# Patient Record
Sex: FEMALE | Race: WHITE | Employment: FULL TIME | URBAN - METROPOLITAN AREA
[De-identification: names, ages, dates, MRNs, and addresses within clinical notes are randomized per-mention and may not be internally consistent; named-entity substitution may affect disease eponyms.]

---

## 2017-03-24 ENCOUNTER — OFFICE VISIT (OUTPATIENT)
Dept: OBGYN CLINIC | Facility: CLINIC | Age: 27
End: 2017-03-24

## 2017-03-24 ENCOUNTER — APPOINTMENT (OUTPATIENT)
Dept: LAB | Facility: HOSPITAL | Age: 27
End: 2017-03-24
Attending: OBSTETRICS & GYNECOLOGY
Payer: COMMERCIAL

## 2017-03-24 VITALS — HEART RATE: 89 BPM | SYSTOLIC BLOOD PRESSURE: 115 MMHG | DIASTOLIC BLOOD PRESSURE: 74 MMHG

## 2017-03-24 DIAGNOSIS — Z11.3 SCREEN FOR STD (SEXUALLY TRANSMITTED DISEASE): ICD-10-CM

## 2017-03-24 DIAGNOSIS — Z01.419 ENCOUNTER FOR GYNECOLOGICAL EXAMINATION WITHOUT ABNORMAL FINDING: Primary | ICD-10-CM

## 2017-03-24 DIAGNOSIS — Z12.4 CERVICAL CANCER SCREENING: ICD-10-CM

## 2017-03-24 LAB
HBV SURFACE AG SERPL QL IA: NONREACTIVE
HCV AB SERPL QL IA: NONREACTIVE
HIV1+2 AB SERPL QL IA: NONREACTIVE
T PALLIDUM AB SER QL: NEGATIVE

## 2017-03-24 PROCEDURE — 36415 COLL VENOUS BLD VENIPUNCTURE: CPT

## 2017-03-24 PROCEDURE — 86803 HEPATITIS C AB TEST: CPT

## 2017-03-24 PROCEDURE — 86780 TREPONEMA PALLIDUM: CPT

## 2017-03-24 PROCEDURE — 87389 HIV-1 AG W/HIV-1&-2 AB AG IA: CPT

## 2017-03-24 PROCEDURE — 99395 PREV VISIT EST AGE 18-39: CPT | Performed by: OBSTETRICS & GYNECOLOGY

## 2017-03-24 PROCEDURE — 87340 HEPATITIS B SURFACE AG IA: CPT
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
## 2017-03-26 LAB
C TRACH DNA SPEC QL NAA+PROBE: NEGATIVE
N GONORRHOEA DNA SPEC QL NAA+PROBE: NEGATIVE

## 2017-03-27 ENCOUNTER — HOSPITAL ENCOUNTER (EMERGENCY)
Facility: HOSPITAL | Age: 27
Discharge: HOME | End: 2017-03-27
Attending: EMERGENCY MEDICINE
Payer: COMMERCIAL

## 2017-03-27 ENCOUNTER — HOSPITAL ENCOUNTER (OUTPATIENT)
Age: 27
Discharge: ANOTHER HOSPITAL | End: 2017-03-27
Attending: FAMILY MEDICINE
Payer: COMMERCIAL

## 2017-03-27 ENCOUNTER — APPOINTMENT (OUTPATIENT)
Dept: ULTRASOUND IMAGING | Facility: HOSPITAL | Age: 27
End: 2017-03-27
Attending: EMERGENCY MEDICINE
Payer: COMMERCIAL

## 2017-03-27 ENCOUNTER — APPOINTMENT (OUTPATIENT)
Dept: CT IMAGING | Facility: HOSPITAL | Age: 27
End: 2017-03-27
Attending: EMERGENCY MEDICINE
Payer: COMMERCIAL

## 2017-03-27 VITALS
HEART RATE: 86 BPM | SYSTOLIC BLOOD PRESSURE: 135 MMHG | RESPIRATION RATE: 16 BRPM | TEMPERATURE: 98 F | DIASTOLIC BLOOD PRESSURE: 83 MMHG | OXYGEN SATURATION: 99 %

## 2017-03-27 VITALS
WEIGHT: 150 LBS | HEART RATE: 76 BPM | RESPIRATION RATE: 16 BRPM | OXYGEN SATURATION: 100 % | TEMPERATURE: 98 F | BODY MASS INDEX: 23.54 KG/M2 | HEIGHT: 67 IN | SYSTOLIC BLOOD PRESSURE: 117 MMHG | DIASTOLIC BLOOD PRESSURE: 85 MMHG

## 2017-03-27 DIAGNOSIS — R11.0 NAUSEA: ICD-10-CM

## 2017-03-27 DIAGNOSIS — R10.11 ABDOMINAL PAIN, RIGHT UPPER QUADRANT: Primary | ICD-10-CM

## 2017-03-27 LAB
ALBUMIN SERPL-MCNC: 3.9 G/DL (ref 3.5–4.8)
ALP LIVER SERPL-CCNC: 67 U/L (ref 37–98)
ALT SERPL-CCNC: 18 U/L (ref 14–54)
AST SERPL-CCNC: 21 U/L (ref 15–41)
BASOPHILS # BLD AUTO: 0.05 X10(3) UL (ref 0–0.1)
BASOPHILS NFR BLD AUTO: 0.6 %
BILIRUB SERPL-MCNC: 0.2 MG/DL (ref 0.1–2)
BILIRUB UR QL STRIP.AUTO: NEGATIVE
BUN BLD-MCNC: 12 MG/DL (ref 8–20)
CALCIUM BLD-MCNC: 8.9 MG/DL (ref 8.3–10.3)
CHLORIDE: 104 MMOL/L (ref 101–111)
CO2: 28 MMOL/L (ref 22–32)
COLOR UR AUTO: YELLOW
CREAT BLD-MCNC: 0.71 MG/DL (ref 0.55–1.02)
EOSINOPHIL # BLD AUTO: 0.22 X10(3) UL (ref 0–0.3)
EOSINOPHIL NFR BLD AUTO: 2.6 %
ERYTHROCYTE [DISTWIDTH] IN BLOOD BY AUTOMATED COUNT: 14.9 % (ref 11.5–16)
GLUCOSE BLD-MCNC: 89 MG/DL (ref 70–99)
GLUCOSE UR STRIP.AUTO-MCNC: NEGATIVE MG/DL
HCT VFR BLD AUTO: 38.1 % (ref 34–50)
HGB BLD-MCNC: 12.6 G/DL (ref 12–16)
IMMATURE GRANULOCYTE COUNT: 0.02 X10(3) UL (ref 0–1)
IMMATURE GRANULOCYTE RATIO %: 0.2 %
KETONES UR STRIP.AUTO-MCNC: NEGATIVE MG/DL
LIPASE: 111 U/L (ref 73–393)
LYMPHOCYTES # BLD AUTO: 1.56 X10(3) UL (ref 0.9–4)
LYMPHOCYTES NFR BLD AUTO: 18.8 %
M PROTEIN MFR SERPL ELPH: 7.8 G/DL (ref 6.1–8.3)
MCH RBC QN AUTO: 27.1 PG (ref 27–33.2)
MCHC RBC AUTO-ENTMCNC: 33.1 G/DL (ref 31–37)
MCV RBC AUTO: 81.9 FL (ref 81–100)
MONOCYTES # BLD AUTO: 0.68 X10(3) UL (ref 0.1–0.6)
MONOCYTES NFR BLD AUTO: 8.2 %
NEUTROPHIL ABS PRELIM: 5.78 X10 (3) UL (ref 1.3–6.7)
NEUTROPHILS # BLD AUTO: 5.78 X10(3) UL (ref 1.3–6.7)
NEUTROPHILS NFR BLD AUTO: 69.6 %
NITRITE UR QL STRIP.AUTO: NEGATIVE
PH UR STRIP.AUTO: 5 [PH] (ref 4.5–8)
PLATELET # BLD AUTO: 293 10(3)UL (ref 150–450)
POCT LOT NUMBER: NORMAL
POCT URINE PREGNANCY: NEGATIVE
POTASSIUM SERPL-SCNC: 3.9 MMOL/L (ref 3.6–5.1)
PROT UR STRIP.AUTO-MCNC: NEGATIVE MG/DL
RBC # BLD AUTO: 4.65 X10(6)UL (ref 3.8–5.1)
RBC UR QL AUTO: NEGATIVE
RED CELL DISTRIBUTION WIDTH-SD: 44.8 FL (ref 35.1–46.3)
SODIUM SERPL-SCNC: 137 MMOL/L (ref 136–144)
SP GR UR STRIP.AUTO: 1.02 (ref 1–1.03)
UROBILINOGEN UR STRIP.AUTO-MCNC: <2 MG/DL
WBC # BLD AUTO: 8.3 X10(3) UL (ref 4–13)

## 2017-03-27 PROCEDURE — 87086 URINE CULTURE/COLONY COUNT: CPT | Performed by: EMERGENCY MEDICINE

## 2017-03-27 PROCEDURE — 76700 US EXAM ABDOM COMPLETE: CPT

## 2017-03-27 PROCEDURE — 96361 HYDRATE IV INFUSION ADD-ON: CPT

## 2017-03-27 PROCEDURE — 74177 CT ABD & PELVIS W/CONTRAST: CPT

## 2017-03-27 PROCEDURE — 99284 EMERGENCY DEPT VISIT MOD MDM: CPT

## 2017-03-27 PROCEDURE — 81001 URINALYSIS AUTO W/SCOPE: CPT | Performed by: EMERGENCY MEDICINE

## 2017-03-27 PROCEDURE — 80053 COMPREHEN METABOLIC PANEL: CPT | Performed by: EMERGENCY MEDICINE

## 2017-03-27 PROCEDURE — 85025 COMPLETE CBC W/AUTO DIFF WBC: CPT | Performed by: EMERGENCY MEDICINE

## 2017-03-27 PROCEDURE — 99215 OFFICE O/P EST HI 40 MIN: CPT

## 2017-03-27 PROCEDURE — 99285 EMERGENCY DEPT VISIT HI MDM: CPT

## 2017-03-27 PROCEDURE — 81025 URINE PREGNANCY TEST: CPT

## 2017-03-27 PROCEDURE — 83690 ASSAY OF LIPASE: CPT | Performed by: EMERGENCY MEDICINE

## 2017-03-27 PROCEDURE — 96374 THER/PROPH/DIAG INJ IV PUSH: CPT

## 2017-03-27 RX ORDER — ONDANSETRON 4 MG/1
8 TABLET, ORALLY DISINTEGRATING ORAL ONCE
Status: COMPLETED | OUTPATIENT
Start: 2017-03-27 | End: 2017-03-27

## 2017-03-27 RX ORDER — KETOROLAC TROMETHAMINE 30 MG/ML
30 INJECTION, SOLUTION INTRAMUSCULAR; INTRAVENOUS ONCE
Status: COMPLETED | OUTPATIENT
Start: 2017-03-27 | End: 2017-03-27

## 2017-03-27 NOTE — ED PROVIDER NOTES
Patient Seen in: [facility] Emergency Department    History   Patient presents with:  Abdomen/Flank Pain (GI/)    Stated Complaint: abd pain r/o arnold    HPI    18-year-old female presents emergency room with chief complaint of intermittent right 0938 98.2 °F (36.8 °C)   Temp src 03/27/17 0938 Temporal   SpO2 03/27/17 0938 99 %   O2 Device 03/27/17 0938 None (Room air)       Current:/85 mmHg  Pulse 76  Temp(Src) 98.2 °F (36.8 °C) (Temporal)  Resp 16  Ht 170.2 cm (5' 7\")  Wt 68.04 kg  BMI 23. Abnormal            Final result                 Please view results for these tests on the individual orders.    POCT PREGNANCY, URINE   RAINBOW DRAW BLUE   RAINBOW DRAW GOLD   RAINBOW DRAW LAVENDER   RAINBOW DRAW LIGHT GREEN   UA HOLD   URINE CULTU

## 2017-03-27 NOTE — ED NOTES
Patient reports pain remains controlled, declines offer of pain medication. No distress observed. Remains updated with plan of care. Taken to CT at this time by cart by PCT.

## 2017-03-27 NOTE — ED INITIAL ASSESSMENT (HPI)
For the past 2 days, patient c/o RUQ abdominal pain, worse after she eats. Pain radiates down abdomen and towards the right back. C/o nausea, no vomiting or fevers. Last BM yesterday and patient feels gassy. Patient not eating meat during this time.   Sonido Prophet

## 2017-03-27 NOTE — ED PROVIDER NOTES
Patient Seen in: [address]    History   Patient presents with:  Abdominal Pain    Stated Complaint: pain in upper right abdomin x1 week    HPI    16-year-old female presents to the clinic today with chief complaints of rig equivalent per week       Comment: occasionally      Review of Systems    Positive for stated complaint: pain in upper right abdomin x1 week  Other systems are as noted in HPI. Constitutional and vital signs reviewed.       All other systems reviewed and n appear to be consistent with cholecystitis/cholelithiasis. Patient likely needs a CT and/or ultrasound. Due to lack of these imaging here, patient was instructed to proceed to the emergency room for further treatment. She was given Zofran 8 mg p.o.  ×1.

## 2017-03-27 NOTE — ED INITIAL ASSESSMENT (HPI)
Patient reports RUQ pain that sometimes goes around into back, reports intermittent since Monday and now becoming more frequent. States most severe and burning after eating, sometimes sharp/throbbing. Denies urinary complaints. Denies fever/chills.  Admits

## 2017-03-28 ENCOUNTER — OFFICE VISIT (OUTPATIENT)
Dept: GASTROENTEROLOGY | Facility: CLINIC | Age: 27
End: 2017-03-28

## 2017-03-28 VITALS
WEIGHT: 153.19 LBS | SYSTOLIC BLOOD PRESSURE: 110 MMHG | BODY MASS INDEX: 24.04 KG/M2 | HEIGHT: 67 IN | DIASTOLIC BLOOD PRESSURE: 74 MMHG | HEART RATE: 80 BPM

## 2017-03-28 DIAGNOSIS — K82.4 GALLBLADDER POLYP: ICD-10-CM

## 2017-03-28 DIAGNOSIS — R10.11 RUQ PAIN: Primary | ICD-10-CM

## 2017-03-28 PROCEDURE — 99212 OFFICE O/P EST SF 10 MIN: CPT | Performed by: PHYSICIAN ASSISTANT

## 2017-03-28 PROCEDURE — 99244 OFF/OP CNSLTJ NEW/EST MOD 40: CPT | Performed by: PHYSICIAN ASSISTANT

## 2017-03-28 NOTE — PATIENT INSTRUCTIONS
1. BLAND BRAT diet - bananas, rice, applesauce toast and soup - until Friday morning (then trial oatmeal/normal diet)    2. Avoid fatty/greasy foods. 3. Keep a food and symptom diary. 4. Call me and let me know how you are doing.     5. Follow up ultr

## 2017-03-28 NOTE — H&P
[address] [facility] [address] Gastroenterology                                                                                                  Clinic History and Physical     Pa both parents with GB removal    * Mother with cholecystectomy at age 28  - Negative for colon, esophageal and gastric cancer    Prior endoscopies:  - None    Pertinent Surgical Hx:  - None  - No known problems with sedation    Social Hx:  - Non-smoking  - and heat intolerance  MUSCULOSKELETAL:  negative for joint effusion/severe erythema; no new joint complaints   BEHAVIOR/PSYCH:  negative for psychotic behavior      PHYSICAL EXAM:   Blood pressure 110/74, pulse 80, height 5' 7\" (1.702 m), weight 153 lb 3. otherwise healthy patient.      #RUQ pain - comes and goes; colicky   #Nausea/decreased appetite - improved with BRAT diet    Recommend:  - BRAT diet until Friday - then trial foods as tolerated   - Call with symptoms Friday  - Keep food and symptom diary

## 2017-04-09 ENCOUNTER — HOSPITAL ENCOUNTER (OUTPATIENT)
Age: 27
Discharge: HOME | End: 2017-04-09
Attending: FAMILY MEDICINE
Payer: COMMERCIAL

## 2017-04-09 ENCOUNTER — APPOINTMENT (OUTPATIENT)
Dept: GENERAL RADIOLOGY | Age: 27
End: 2017-04-09
Attending: FAMILY MEDICINE
Payer: COMMERCIAL

## 2017-04-09 VITALS
SYSTOLIC BLOOD PRESSURE: 121 MMHG | OXYGEN SATURATION: 98 % | HEART RATE: 112 BPM | TEMPERATURE: 98 F | WEIGHT: 155 LBS | HEIGHT: 67 IN | RESPIRATION RATE: 16 BRPM | BODY MASS INDEX: 24.33 KG/M2 | DIASTOLIC BLOOD PRESSURE: 83 MMHG

## 2017-04-09 DIAGNOSIS — J40 BRONCHITIS: Primary | ICD-10-CM

## 2017-04-09 PROCEDURE — 99214 OFFICE O/P EST MOD 30 MIN: CPT

## 2017-04-09 PROCEDURE — 99213 OFFICE O/P EST LOW 20 MIN: CPT

## 2017-04-09 PROCEDURE — 71020 XR CHEST PA + LAT CHEST (CPT=71020): CPT

## 2017-04-09 RX ORDER — AZITHROMYCIN 250 MG/1
TABLET, FILM COATED ORAL
Qty: 1 PACKAGE | Refills: 0 | Status: SHIPPED | OUTPATIENT
Start: 2017-04-09 | End: 2017-04-14

## 2017-04-09 NOTE — ED PROVIDER NOTES
Patient Seen in: [address]    History   Patient presents with:  Cough/URI    Stated Complaint: cough    Patient is a 23year old female presenting with cough. The history is provided by the patient.    Cough  This is a n signs reviewed. All other systems reviewed and negative except as noted above. PSFH elements reviewed from today and agreed except as otherwise stated in HPI.     Physical Exam     ED Triage Vitals   BP 04/09/17 1402 121/83 mmHg   Pulse 04/09/17 140 Normal, Disp-1 Package, Digerati0

## 2017-04-09 NOTE — ED INITIAL ASSESSMENT (HPI)
Patient states having productive cough x 5 days ago, patient now c/o cough with chest congestion, loss of appetite, SOB noted with cough and ear pressure. Patient denies fever, states having chills yesterday.   Patient denies sore throat, abdominal, n/v or

## 2017-04-14 ENCOUNTER — OFFICE VISIT (OUTPATIENT)
Dept: INTERNAL MEDICINE CLINIC | Facility: CLINIC | Age: 27
End: 2017-04-14

## 2017-04-14 VITALS
OXYGEN SATURATION: 98 % | SYSTOLIC BLOOD PRESSURE: 107 MMHG | HEIGHT: 67 IN | BODY MASS INDEX: 23.86 KG/M2 | HEART RATE: 75 BPM | DIASTOLIC BLOOD PRESSURE: 72 MMHG | WEIGHT: 152 LBS

## 2017-04-14 DIAGNOSIS — K80.50 BILIARY COLIC: ICD-10-CM

## 2017-04-14 DIAGNOSIS — J18.9 CAP (COMMUNITY ACQUIRED PNEUMONIA): Primary | ICD-10-CM

## 2017-04-14 PROCEDURE — 99212 OFFICE O/P EST SF 10 MIN: CPT | Performed by: INTERNAL MEDICINE

## 2017-04-14 PROCEDURE — 99213 OFFICE O/P EST LOW 20 MIN: CPT | Performed by: INTERNAL MEDICINE

## 2017-04-14 NOTE — PROGRESS NOTES
Had 2 er visits for biliary colic with negative gi eval and then for r ll pneomonits  On a z pack feels improved has gi usept and diarrhea  Blood pressure 107/72, pulse 75, height 5' 7\" (1.702 m), weight 152 lb (68.947 kg), last menstrual period 04/11/201

## 2017-05-03 ENCOUNTER — OFFICE VISIT (OUTPATIENT)
Dept: OBGYN CLINIC | Facility: CLINIC | Age: 27
End: 2017-05-03

## 2017-05-03 VITALS
HEART RATE: 80 BPM | WEIGHT: 150.81 LBS | SYSTOLIC BLOOD PRESSURE: 121 MMHG | DIASTOLIC BLOOD PRESSURE: 76 MMHG | BODY MASS INDEX: 24 KG/M2

## 2017-05-03 DIAGNOSIS — N93.9 ABNORMAL UTERINE BLEEDING (AUB): Primary | ICD-10-CM

## 2017-05-03 PROCEDURE — 99213 OFFICE O/P EST LOW 20 MIN: CPT | Performed by: OBSTETRICS & GYNECOLOGY

## 2017-05-03 NOTE — PROGRESS NOTES
Misael Oconnor is a 23year old female [facility] Patient's last menstrual period was 04/26/2017 (exact date). Patient presents with:  Gyn Problem: irregular menses  Patient presents today for irregular bleeding.  She forgot to tell me during her annual Narrative       MEDICATIONS:  No current outpatient prescriptions on file.     ALLERGIES:    Nitrofurantoin              Comment:Other reaction(s): GI irritation, dizziness  Phenylephrine               Comment:Other reaction(s): PHENYLEPHRINE HCL      Revie

## 2017-05-06 ENCOUNTER — HOSPITAL ENCOUNTER (OUTPATIENT)
Age: 27
Discharge: HOME | End: 2017-05-06
Attending: FAMILY MEDICINE
Payer: COMMERCIAL

## 2017-05-06 VITALS
HEART RATE: 80 BPM | OXYGEN SATURATION: 99 % | DIASTOLIC BLOOD PRESSURE: 78 MMHG | HEIGHT: 67 IN | TEMPERATURE: 98 F | BODY MASS INDEX: 23.54 KG/M2 | WEIGHT: 150 LBS | SYSTOLIC BLOOD PRESSURE: 123 MMHG | RESPIRATION RATE: 16 BRPM

## 2017-05-06 DIAGNOSIS — J02.0 STREPTOCOCCAL SORE THROAT: Primary | ICD-10-CM

## 2017-05-06 PROCEDURE — 87430 STREP A AG IA: CPT

## 2017-05-06 PROCEDURE — 99213 OFFICE O/P EST LOW 20 MIN: CPT

## 2017-05-06 PROCEDURE — 99214 OFFICE O/P EST MOD 30 MIN: CPT

## 2017-05-06 RX ORDER — AMOXICILLIN 875 MG/1
875 TABLET, COATED ORAL 2 TIMES DAILY
Qty: 20 TABLET | Refills: 0 | Status: SHIPPED | OUTPATIENT
Start: 2017-05-06 | End: 2017-05-16

## 2017-05-06 NOTE — ED INITIAL ASSESSMENT (HPI)
Patient states having loss of appetite, sore throat, headaches, bilateral ear pain, fever x 3 days. Patient denies n/v or abdominal pain.

## 2017-05-06 NOTE — ED PROVIDER NOTES
Patient Seen in: [address]    History   Patient presents with:  Sore Throat  Fever (infectious)  Ear Problem Pain (neurosensory)    Stated Complaint: sore throat felt warm ear pain    HPI    Patient here with sore throa Temp([facility]) 98.4 °F (36.9 °C) (Oral)  Resp 16  Ht 170.2 cm (5' 7\")  Wt 68.04 kg  BMI 23.49 kg/m2  SpO2 99%  LMP 04/11/2017 (Exact Date)    GENERAL: well developed, well nourished, well hydrated, no distress  HEAD: normocephalic, atraumatic  EYES: sclera no

## 2017-08-15 ENCOUNTER — TELEPHONE (OUTPATIENT)
Dept: PEDIATRICS CLINIC | Facility: CLINIC | Age: 27
End: 2017-08-15

## 2017-08-15 NOTE — TELEPHONE ENCOUNTER
At last visit patient declined hormonal contraception. That will be the only way to try and control her bleeding. If she would like hormonal contraception then will need to make an apt to discuss further.

## 2017-08-15 NOTE — TELEPHONE ENCOUNTER
Pt is still having irregular spotting between periods. States it is lighter than a normal period but heavier than spotting.

## 2017-08-15 NOTE — TELEPHONE ENCOUNTER
Pt calling to report she continues having irregular bleeding. Pt states she saw [facility] a few months ago for the same issue. Pt reports she is getting monthly periods with break through bleeding that last 3-5 days.  Pt reports the break through bleeding is more

## 2017-08-15 NOTE — TELEPHONE ENCOUNTER
Lmtcb. (see 5/3/17 [facility] notes: POC for Provera if irregular bleeding continues.  Does pt want to proceed with POC?)

## 2017-08-18 ENCOUNTER — APPOINTMENT (OUTPATIENT)
Dept: LAB | Facility: HOSPITAL | Age: 27
End: 2017-08-18
Attending: OBSTETRICS & GYNECOLOGY
Payer: COMMERCIAL

## 2017-08-18 ENCOUNTER — OFFICE VISIT (OUTPATIENT)
Dept: OBGYN CLINIC | Facility: CLINIC | Age: 27
End: 2017-08-18

## 2017-08-18 VITALS
SYSTOLIC BLOOD PRESSURE: 112 MMHG | BODY MASS INDEX: 23 KG/M2 | DIASTOLIC BLOOD PRESSURE: 77 MMHG | WEIGHT: 148.63 LBS | HEART RATE: 88 BPM

## 2017-08-18 DIAGNOSIS — N92.6 IRREGULAR MENSES: Primary | ICD-10-CM

## 2017-08-18 DIAGNOSIS — N92.6 IRREGULAR MENSES: ICD-10-CM

## 2017-08-18 LAB
ERYTHROCYTE [DISTWIDTH] IN BLOOD BY AUTOMATED COUNT: 14.7 % (ref 11–15)
HCT VFR BLD AUTO: 39.9 % (ref 35–48)
HGB BLD-MCNC: 13 G/DL (ref 12–16)
MCH RBC QN AUTO: 28.2 PG (ref 27–32)
MCHC RBC AUTO-ENTMCNC: 32.7 G/DL (ref 32–37)
MCV RBC AUTO: 86.3 FL (ref 80–100)
PLATELET # BLD AUTO: 284 K/UL (ref 140–400)
PMV BLD AUTO: 10.1 FL (ref 7.4–10.3)
RBC # BLD AUTO: 4.62 M/UL (ref 3.7–5.4)
TSH SERPL-ACNC: 1.58 UIU/ML (ref 0.45–5.33)
WBC # BLD AUTO: 6.1 K/UL (ref 4–11)

## 2017-08-18 PROCEDURE — 84443 ASSAY THYROID STIM HORMONE: CPT

## 2017-08-18 PROCEDURE — 99214 OFFICE O/P EST MOD 30 MIN: CPT | Performed by: OBSTETRICS & GYNECOLOGY

## 2017-08-18 PROCEDURE — 36415 COLL VENOUS BLD VENIPUNCTURE: CPT

## 2017-08-18 PROCEDURE — 85027 COMPLETE CBC AUTOMATED: CPT

## 2017-08-18 RX ORDER — DROSPIRENONE AND ETHINYL ESTRADIOL 0.02-3(28)
1 KIT ORAL DAILY
Qty: 1 PACKAGE | Refills: 11 | Status: SHIPPED | OUTPATIENT
Start: 2017-08-18 | End: 2018-08-23

## 2017-08-18 NOTE — PROGRESS NOTES
Francisco Stark is a 33year old female  Patient's last menstrual period was 2017. Patient presents with:  Consult: options for irregular bleeding  Patient continues to have abnormal uterine bleeding since last visit.  She states she has (GIANVI) 3-0.02 MG Oral Tab, Take 1 tablet by mouth daily. , Disp: 1 Package, Rfl: 11    ALLERGIES:    Nitrofurantoin              Comment:Other reaction(s): GI irritation, dizziness  Phenylephrine               Comment:Other reaction(s): PHENYLEPHRINE HCL Reviewed OCPs as well as NuvaRing. This would also help acne. Discussed no menses with Mirena IUD option. Reviewed risks and benefits with the patient. Patient is not interested. 3. eRx sent for OCPs to start with next menses   4.  Encourage continued cond

## 2017-09-05 ENCOUNTER — TELEPHONE (OUTPATIENT)
Dept: GASTROENTEROLOGY | Facility: CLINIC | Age: 27
End: 2017-09-05

## 2017-09-05 ENCOUNTER — APPOINTMENT (OUTPATIENT)
Dept: ULTRASOUND IMAGING | Facility: HOSPITAL | Age: 27
End: 2017-09-05
Attending: PHYSICIAN ASSISTANT
Payer: COMMERCIAL

## 2017-09-05 ENCOUNTER — APPOINTMENT (OUTPATIENT)
Dept: CT IMAGING | Facility: HOSPITAL | Age: 27
End: 2017-09-05
Attending: PHYSICIAN ASSISTANT
Payer: COMMERCIAL

## 2017-09-05 ENCOUNTER — HOSPITAL ENCOUNTER (EMERGENCY)
Facility: HOSPITAL | Age: 27
Discharge: HOME | End: 2017-09-05
Attending: PHYSICIAN ASSISTANT
Payer: COMMERCIAL

## 2017-09-05 VITALS
TEMPERATURE: 99 F | WEIGHT: 150 LBS | RESPIRATION RATE: 16 BRPM | BODY MASS INDEX: 23.54 KG/M2 | HEIGHT: 67 IN | DIASTOLIC BLOOD PRESSURE: 68 MMHG | SYSTOLIC BLOOD PRESSURE: 102 MMHG | HEART RATE: 75 BPM | OXYGEN SATURATION: 99 %

## 2017-09-05 DIAGNOSIS — R10.9 ABDOMINAL PAIN, ACUTE: Primary | ICD-10-CM

## 2017-09-05 DIAGNOSIS — K82.4 GALLBLADDER POLYP: ICD-10-CM

## 2017-09-05 DIAGNOSIS — R11.0 NAUSEA: ICD-10-CM

## 2017-09-05 LAB
ALBUMIN SERPL BCP-MCNC: 4.4 G/DL (ref 3.5–4.8)
ALP SERPL-CCNC: 48 U/L (ref 32–100)
ALT SERPL-CCNC: 13 U/L (ref 14–54)
ANION GAP SERPL CALC-SCNC: 10 MMOL/L (ref 0–18)
AST SERPL-CCNC: 23 U/L (ref 15–41)
B-HCG UR QL: NEGATIVE
BASOPHILS # BLD: 0 K/UL (ref 0–0.2)
BASOPHILS NFR BLD: 0 %
BILIRUB DIRECT SERPL-MCNC: 0.1 MG/DL (ref 0–0.2)
BILIRUB SERPL-MCNC: 0.9 MG/DL (ref 0.3–1.2)
BILIRUB UR QL: NEGATIVE
BUN SERPL-MCNC: 12 MG/DL (ref 8–20)
BUN/CREAT SERPL: 14.6 (ref 10–20)
CALCIUM SERPL-MCNC: 9.2 MG/DL (ref 8.5–10.5)
CHLORIDE SERPL-SCNC: 102 MMOL/L (ref 95–110)
CO2 SERPL-SCNC: 24 MMOL/L (ref 22–32)
COLOR UR: YELLOW
CREAT SERPL-MCNC: 0.82 MG/DL (ref 0.5–1.5)
EOSINOPHIL # BLD: 0.1 K/UL (ref 0–0.7)
EOSINOPHIL NFR BLD: 2 %
ERYTHROCYTE [DISTWIDTH] IN BLOOD BY AUTOMATED COUNT: 14.2 % (ref 11–15)
GLUCOSE SERPL-MCNC: 98 MG/DL (ref 70–99)
GLUCOSE UR-MCNC: NEGATIVE MG/DL
HCT VFR BLD AUTO: 37.9 % (ref 35–48)
HGB BLD-MCNC: 12.8 G/DL (ref 12–16)
KETONES UR-MCNC: NEGATIVE MG/DL
LIPASE SERPL-CCNC: 23 U/L (ref 22–51)
LYMPHOCYTES # BLD: 1.2 K/UL (ref 1–4)
LYMPHOCYTES NFR BLD: 13 %
MCH RBC QN AUTO: 28.9 PG (ref 27–32)
MCHC RBC AUTO-ENTMCNC: 33.8 G/DL (ref 32–37)
MCV RBC AUTO: 85.4 FL (ref 80–100)
MONOCYTES # BLD: 0.6 K/UL (ref 0–1)
MONOCYTES NFR BLD: 7 %
NEUTROPHILS # BLD AUTO: 7.5 K/UL (ref 1.8–7.7)
NEUTROPHILS NFR BLD: 79 %
NITRITE UR QL STRIP.AUTO: NEGATIVE
OSMOLALITY UR CALC.SUM OF ELEC: 282 MOSM/KG (ref 275–295)
PH UR: 5 [PH] (ref 5–8)
PLATELET # BLD AUTO: 272 K/UL (ref 140–400)
PMV BLD AUTO: 9.2 FL (ref 7.4–10.3)
POTASSIUM SERPL-SCNC: 3.7 MMOL/L (ref 3.3–5.1)
PROT SERPL-MCNC: 7.6 G/DL (ref 5.9–8.4)
PROT UR-MCNC: NEGATIVE MG/DL
RBC # BLD AUTO: 4.44 M/UL (ref 3.7–5.4)
RBC #/AREA URNS AUTO: 5 /HPF
SODIUM SERPL-SCNC: 136 MMOL/L (ref 136–144)
SP GR UR STRIP: 1.02 (ref 1–1.03)
UROBILINOGEN UR STRIP-ACNC: <2
VIT C UR-MCNC: NEGATIVE MG/DL
WBC # BLD AUTO: 9.5 K/UL (ref 4–11)
WBC #/AREA URNS AUTO: 2 /HPF

## 2017-09-05 PROCEDURE — 80076 HEPATIC FUNCTION PANEL: CPT | Performed by: PHYSICIAN ASSISTANT

## 2017-09-05 PROCEDURE — 83690 ASSAY OF LIPASE: CPT | Performed by: PHYSICIAN ASSISTANT

## 2017-09-05 PROCEDURE — 99284 EMERGENCY DEPT VISIT MOD MDM: CPT

## 2017-09-05 PROCEDURE — 80048 BASIC METABOLIC PNL TOTAL CA: CPT | Performed by: PHYSICIAN ASSISTANT

## 2017-09-05 PROCEDURE — 81025 URINE PREGNANCY TEST: CPT

## 2017-09-05 PROCEDURE — 74177 CT ABD & PELVIS W/CONTRAST: CPT | Performed by: PHYSICIAN ASSISTANT

## 2017-09-05 PROCEDURE — 85025 COMPLETE CBC W/AUTO DIFF WBC: CPT | Performed by: PHYSICIAN ASSISTANT

## 2017-09-05 PROCEDURE — 96374 THER/PROPH/DIAG INJ IV PUSH: CPT

## 2017-09-05 PROCEDURE — 76705 ECHO EXAM OF ABDOMEN: CPT | Performed by: PHYSICIAN ASSISTANT

## 2017-09-05 PROCEDURE — 81001 URINALYSIS AUTO W/SCOPE: CPT | Performed by: PHYSICIAN ASSISTANT

## 2017-09-05 RX ORDER — ONDANSETRON 4 MG/1
4 TABLET, ORALLY DISINTEGRATING ORAL EVERY 6 HOURS PRN
Qty: 10 TABLET | Refills: 0 | Status: SHIPPED | OUTPATIENT
Start: 2017-09-05 | End: 2017-09-08

## 2017-09-05 RX ORDER — TRAMADOL HYDROCHLORIDE 50 MG/1
50 TABLET ORAL EVERY 6 HOURS PRN
Qty: 12 TABLET | Refills: 0 | Status: SHIPPED | OUTPATIENT
Start: 2017-09-05 | End: 2018-06-07 | Stop reason: ALTCHOICE

## 2017-09-05 RX ORDER — ONDANSETRON 2 MG/ML
4 INJECTION INTRAMUSCULAR; INTRAVENOUS ONCE
Status: COMPLETED | OUTPATIENT
Start: 2017-09-05 | End: 2017-09-05

## 2017-09-05 NOTE — ED PROVIDER NOTES
Patient Seen in: [facility] Emergency Department    History   Patient presents with:  Abdomen/Flank Pain (GI/)    Stated Complaint:     HPI    64-year-old female presents with chief complaint of right upper quadrant abdominal pain.   Patient stat 0.0 oz/week     Comment: OCC      Review of Systems    Positive for stated complaint:   Other systems are as noted in HPI. Constitutional and vital signs reviewed. All other systems reviewed and negative except as noted above.     PSFH elements review speech. Skin: Skin is normal to inspection. Warm and dry. No obvious bruising. No obvious rash.     ED Course     Labs Reviewed   URINALYSIS WITH CULTURE REFLEX - Abnormal; Notable for the following:        Result Value    Clarity Urine Hazy (*)     Blo Small volume free fluid in the pelvis, likely physiologic. 6. Bilateral L5-S1 spondylolysis without significant spondylolisthesis. 7. Lesser incidental findings as above. Patient refused offered pain medication.   Physical exam remained stable over

## 2017-09-05 NOTE — TELEPHONE ENCOUNTER
Patient last seen in March 2017 - \"Patient describes symptoms suggestive of biliary colic. Negative CT A/P (and US - no dilatation nor stones observed - with normal labs. Reassuring improvement of symptoms with BRAT diet - less nausea today.  No fevers/chi

## 2017-09-05 NOTE — TELEPHONE ENCOUNTER
Pt contacted and accepted f/u appt with PB on 9/7/17 @ 9:30am, directions provided to the [facility].

## 2017-09-05 NOTE — TELEPHONE ENCOUNTER
CHASE to MAGNOLIA    Pt contacted and reviewed MAGNOLIA's message below, she verbalized understanding. She states she is on her way to the ED.

## 2017-09-05 NOTE — TELEPHONE ENCOUNTER
Pt was in the E.R  today and was told to be seen within the next 2 days. Pt had previous seen MAGNOLIA penat before. Pls call at:[phone],thanks.

## 2017-09-05 NOTE — ED NOTES
Pt c/o right sided abdominal pain that started yesterday with nausea. Pt states took advil yesterday without relief. Pt with hx of biliary colic, unsure if pain is similar.

## 2017-09-05 NOTE — TELEPHONE ENCOUNTER
Pt states that she has been having severe nausea and abdominal cramping underneath ribs on right side that radiates to back since yesterday afternoon. Call transferred to RN.

## 2017-09-05 NOTE — ED INITIAL ASSESSMENT (HPI)
Patient complains of abd pain which started yesterday, states she has hx of intermittent abd pain d/t biliary colic, states the pain only lasts a couple hours but this time the pain is not going away, states she has nausea but no vomiting, no diarrhea

## 2017-09-05 NOTE — TELEPHONE ENCOUNTER
Call transferred to RN:    Pt states she has \"severe abdominal pain and nausea. \" She was unable to sleep las night and her pain starts underneath her ribs and radiates to her back. She took ibuprofen OTC w/o relief of her sxs.  States she was unable to [employer]

## 2017-09-06 ENCOUNTER — HOSPITAL ENCOUNTER (OUTPATIENT)
Age: 27
Discharge: HOME | End: 2017-09-06
Attending: FAMILY MEDICINE
Payer: COMMERCIAL

## 2017-09-06 VITALS
SYSTOLIC BLOOD PRESSURE: 124 MMHG | RESPIRATION RATE: 17 BRPM | HEART RATE: 107 BPM | DIASTOLIC BLOOD PRESSURE: 72 MMHG | BODY MASS INDEX: 23.54 KG/M2 | WEIGHT: 150 LBS | TEMPERATURE: 99 F | HEIGHT: 67 IN | OXYGEN SATURATION: 99 %

## 2017-09-06 DIAGNOSIS — J02.9 ACUTE PHARYNGITIS, UNSPECIFIED ETIOLOGY: Primary | ICD-10-CM

## 2017-09-06 LAB — S PYO AG THROAT QL: NEGATIVE

## 2017-09-06 PROCEDURE — 99213 OFFICE O/P EST LOW 20 MIN: CPT

## 2017-09-06 PROCEDURE — 99214 OFFICE O/P EST MOD 30 MIN: CPT

## 2017-09-06 PROCEDURE — 87430 STREP A AG IA: CPT

## 2017-09-06 RX ORDER — AZITHROMYCIN 250 MG/1
TABLET, FILM COATED ORAL
Qty: 1 PACKAGE | Refills: 0 | Status: SHIPPED | OUTPATIENT
Start: 2017-09-06 | End: 2017-09-06

## 2017-09-06 RX ORDER — AZITHROMYCIN 250 MG/1
TABLET, FILM COATED ORAL
Qty: 1 PACKAGE | Refills: 0 | Status: SHIPPED | OUTPATIENT
Start: 2017-09-06 | End: 2017-09-11

## 2017-09-06 NOTE — ED PROVIDER NOTES
No chief complaint on file. HPI:     Tiera Shields is a 33year old female who presents with for chief complaint of nasal congestion, sore throat, cough and low grade fever. X 4 days  Symptoms have been gradually worsened since that time.    The azithromycin (ZITHROMAX Z-OMAR) 250 MG Oral Tab          Si mg once followed by 250 mg daily x 4 days          Dispense:  1 Package          Refill:  0    Labs performed this visit:    Recent Results (from the past 10 hour(s))  -[facility] POCT RAPID STREP

## 2017-09-06 NOTE — ED INITIAL ASSESSMENT (HPI)
Pt here to IC with c/o \"I have strep, Im here for an antibiotic\", pt has not been tested yet but c/o sorethroat, stuffy ears for the past several days. Resp easy and regular. Pt was seen in the ED yesterday.

## 2017-09-07 ENCOUNTER — OFFICE VISIT (OUTPATIENT)
Dept: GASTROENTEROLOGY | Facility: CLINIC | Age: 27
End: 2017-09-07

## 2017-09-07 VITALS
SYSTOLIC BLOOD PRESSURE: 90 MMHG | BODY MASS INDEX: 23.89 KG/M2 | HEART RATE: 78 BPM | HEIGHT: 66.25 IN | WEIGHT: 148.63 LBS | DIASTOLIC BLOOD PRESSURE: 68 MMHG

## 2017-09-07 DIAGNOSIS — Z09 FOLLOW UP: ICD-10-CM

## 2017-09-07 DIAGNOSIS — R12 HEARTBURN: Primary | ICD-10-CM

## 2017-09-07 DIAGNOSIS — R10.11 COLICKY RUQ ABDOMINAL PAIN: ICD-10-CM

## 2017-09-07 PROCEDURE — 99212 OFFICE O/P EST SF 10 MIN: CPT | Performed by: PHYSICIAN ASSISTANT

## 2017-09-07 PROCEDURE — 99213 OFFICE O/P EST LOW 20 MIN: CPT | Performed by: PHYSICIAN ASSISTANT

## 2017-09-07 RX ORDER — OMEPRAZOLE 20 MG/1
20 CAPSULE, DELAYED RELEASE ORAL EVERY MORNING
Qty: 90 CAPSULE | Refills: 0 | Status: SHIPPED | OUTPATIENT
Start: 2017-09-07 | End: 2017-09-21

## 2017-09-07 NOTE — PROGRESS NOTES
[facility] Follow-up Visit    Ella with GB removal                           * Mother with cholecystectomy at age 28  - Negative for colon, esophageal and gastric cancer     Prior endoscopies:  - None     Pertinent Surgical Hx:  - None  - No known problems with sedation     Social Hx:  - No (severe pain).  Disp: 12 tablet Rfl: 0       Allergies:    Nitrofurantoin              Comment:Other reaction(s): GI irritation, dizziness  Phenylephrine               Comment:Other reaction(s): PHENYLEPHRINE HCL,LETHARGIC    ROS:   CONSTITUTIONAL:  negativ due to unrelenting RUQ pain. To date, patient has now undergone two essentially unremarkable CT A/P and US of GB without clear findings. Known GB polyp (4 mm --> 3.5 mm most recent US). Presumed to be due to cholelithiasis. LFTs WNL, no anemia.  No abdomina

## 2017-09-07 NOTE — PATIENT INSTRUCTIONS
1. I want you to see Dr. Hermelinda Carbajal or Dr. Ernesto Pan for consideration of elective cholecystectomy. 2. I would like you to start Omeprazole 20 mg daily.  This should be taken 30 minutes before breakfast.  - If your symptoms are due to heartburn, this should h

## 2018-06-01 ENCOUNTER — APPOINTMENT (OUTPATIENT)
Dept: CT IMAGING | Facility: HOSPITAL | Age: 28
End: 2018-06-01
Attending: PHYSICIAN ASSISTANT
Payer: COMMERCIAL

## 2018-06-01 ENCOUNTER — HOSPITAL ENCOUNTER (EMERGENCY)
Facility: HOSPITAL | Age: 28
Discharge: HOME | End: 2018-06-01
Attending: PHYSICIAN ASSISTANT
Payer: COMMERCIAL

## 2018-06-01 VITALS
WEIGHT: 148 LBS | SYSTOLIC BLOOD PRESSURE: 121 MMHG | DIASTOLIC BLOOD PRESSURE: 87 MMHG | HEIGHT: 67 IN | HEART RATE: 84 BPM | BODY MASS INDEX: 23.23 KG/M2 | OXYGEN SATURATION: 98 % | TEMPERATURE: 98 F | RESPIRATION RATE: 18 BRPM

## 2018-06-01 DIAGNOSIS — S09.90XA CLOSED HEAD INJURY WITHOUT LOSS OF CONSCIOUSNESS, INITIAL ENCOUNTER: ICD-10-CM

## 2018-06-01 DIAGNOSIS — S16.1XXA STRAIN OF NECK MUSCLE, INITIAL ENCOUNTER: Primary | ICD-10-CM
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
## 2018-06-01 PROCEDURE — 70450 CT HEAD/BRAIN W/O DYE: CPT | Performed by: PHYSICIAN ASSISTANT

## 2018-06-01 PROCEDURE — 99284 EMERGENCY DEPT VISIT MOD MDM: CPT

## 2018-06-01 PROCEDURE — 72125 CT NECK SPINE W/O DYE: CPT | Performed by: PHYSICIAN ASSISTANT

## 2018-06-01 RX ORDER — IBUPROFEN 600 MG/1
TABLET ORAL
Qty: 20 TABLET | Refills: 0 | Status: SHIPPED | OUTPATIENT
Start: 2018-06-01 | End: 2018-08-23

## 2018-06-01 RX ORDER — IBUPROFEN 600 MG/1
600 TABLET ORAL ONCE
Status: COMPLETED | OUTPATIENT
Start: 2018-06-01 | End: 2018-06-01

## 2018-06-01 RX ORDER — CYCLOBENZAPRINE HCL 10 MG
10 TABLET ORAL ONCE
Status: COMPLETED | OUTPATIENT
Start: 2018-06-01 | End: 2018-06-01

## 2018-06-01 RX ORDER — CYCLOBENZAPRINE HCL 10 MG
10 TABLET ORAL 3 TIMES DAILY PRN
Qty: 14 TABLET | Refills: 0 | Status: SHIPPED | OUTPATIENT
Start: 2018-06-01 | End: 2018-06-08
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
## 2018-06-02 NOTE — ED NOTES
Discharged home into care of mother with plan to follow up with PCP as indicated. Alert and interactive. Hemodynamically stable. Agrees with pain management plan.  Ambulates to exit with steady gait
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
## 2018-06-07 ENCOUNTER — OFFICE VISIT (OUTPATIENT)
Dept: INTERNAL MEDICINE CLINIC | Facility: CLINIC | Age: 28
End: 2018-06-07

## 2018-06-07 VITALS
WEIGHT: 145.31 LBS | RESPIRATION RATE: 18 BRPM | TEMPERATURE: 99 F | DIASTOLIC BLOOD PRESSURE: 82 MMHG | HEIGHT: 66.25 IN | SYSTOLIC BLOOD PRESSURE: 114 MMHG | HEART RATE: 86 BPM | BODY MASS INDEX: 23.35 KG/M2
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
## 2018-06-07 PROCEDURE — 1111F DSCHRG MED/CURRENT MED MERGE: CPT | Performed by: INTERNAL MEDICINE

## 2018-06-07 PROCEDURE — 99213 OFFICE O/P EST LOW 20 MIN: CPT | Performed by: INTERNAL MEDICINE

## 2018-06-07 PROCEDURE — 99212 OFFICE O/P EST SF 10 MIN: CPT | Performed by: INTERNAL MEDICINE
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
## 2018-06-12 ENCOUNTER — TELEPHONE (OUTPATIENT)
Dept: INTERNAL MEDICINE CLINIC | Facility: CLINIC | Age: 28
End: 2018-06-12

## 2018-06-16 ENCOUNTER — NURSE ONLY (OUTPATIENT)
Dept: INTERNAL MEDICINE CLINIC | Facility: CLINIC | Age: 28
End: 2018-06-16

## 2018-06-16 DIAGNOSIS — Z23 IMMUNIZATION DUE: Primary | ICD-10-CM

## 2018-06-16 PROCEDURE — 90471 IMMUNIZATION ADMIN: CPT | Performed by: INTERNAL MEDICINE

## 2018-06-16 PROCEDURE — 90715 TDAP VACCINE 7 YRS/> IM: CPT | Performed by: INTERNAL MEDICINE

## 2018-06-16 NOTE — PROGRESS NOTES
Pt arrived today for a nurse visit to receive a Tdap vaccine. Pt  First and last name verified along side . Pt received vaccine on her left deltoid. Pt tolerated vaccine with no reaction noted. Pt received VIS.

## 2018-07-30 RX ORDER — DROSPIRENONE AND ETHINYL ESTRADIOL TABLETS 0.02-3(28)
1 KIT ORAL DAILY
Qty: 28 TABLET | Refills: 0 | OUTPATIENT
Start: 2018-07-30

## 2018-08-03 RX ORDER — DROSPIRENONE AND ETHINYL ESTRADIOL 0.02-3(28)
KIT ORAL
Qty: 1 PACKAGE | Refills: 0 | Status: SHIPPED | OUTPATIENT
Start: 2018-08-03 | End: 2018-08-23

## 2018-08-03 NOTE — TELEPHONE ENCOUNTER
Pt is requesting a refill for her birth control pill Gianvi. See notes below. Pt states she thinks that [address] wanted her to return in 3 months for f/u if she had irregular bleeding.   Pt states that she has spotting sometimes when the pharmacy switches her

## 2018-08-03 NOTE — TELEPHONE ENCOUNTER
LMTCB. Need to know if pt is requesting a refill on hr ocps for Gianvi. Next Annual is scheduled on 8/23/18 with Kristi Kiran. Last Annual 3/24/17 with Kristi Kiran.   Appt on 8/18/17 was for irregular bleeding and a rx was sent to the pharmacy on 8-18-17 for [facility]

## 2018-08-23 ENCOUNTER — OFFICE VISIT (OUTPATIENT)
Dept: OBGYN CLINIC | Facility: CLINIC | Age: 28
End: 2018-08-23
Payer: COMMERCIAL

## 2018-08-23 VITALS
DIASTOLIC BLOOD PRESSURE: 80 MMHG | HEART RATE: 101 BPM | BODY MASS INDEX: 24.39 KG/M2 | SYSTOLIC BLOOD PRESSURE: 117 MMHG | WEIGHT: 148.19 LBS | HEIGHT: 65.5 IN

## 2018-08-23 DIAGNOSIS — Z30.41 ENCOUNTER FOR BIRTH CONTROL PILLS MAINTENANCE: ICD-10-CM

## 2018-08-23 DIAGNOSIS — Z01.419 ENCOUNTER FOR GYNECOLOGICAL EXAMINATION WITHOUT ABNORMAL FINDING: Primary | ICD-10-CM

## 2018-08-23 PROCEDURE — 99395 PREV VISIT EST AGE 18-39: CPT | Performed by: OBSTETRICS & GYNECOLOGY

## 2018-08-23 RX ORDER — DROSPIRENONE AND ETHINYL ESTRADIOL 0.02-3(28)
KIT ORAL
Qty: 3 PACKAGE | Refills: 4 | Status: SHIPPED | OUTPATIENT
Start: 2018-08-23 | End: 2019-03-21

## 2018-08-23 NOTE — PROGRESS NOTES
Well Woman Exam    HPI:  The patient is a 26yo female who presents for annual exam. She has no complaints. She states she had sever cramping for one cycle that has now resolved. She is using condoms and declines STD screening.    She is going back to school mental illness   • Other [OTHER] Mother      hypoglycemia   • Diabetes Other      multiple   • Glaucoma Neg    • Macular degeneration Neg        MEDICATIONS:    Current Outpatient Prescriptions:   •  Drospirenone-Ethinyl Estradiol (LORYNA) 3-0.02 MG Oral T Normal without tenderness on motion  Uterus: normal in size, contour, position, mobility, without tenderness  Adnexa: normal without masses or tenderness  Perineum: normal    Assessment/Plan:  1. WWE:   1. Reviewed ASCCP guidelines with the patient   2.  Pa

## 2018-09-24 ENCOUNTER — HOSPITAL ENCOUNTER (OUTPATIENT)
Age: 28
Discharge: HOME | End: 2018-09-24
Attending: FAMILY MEDICINE
Payer: COMMERCIAL

## 2018-09-24 ENCOUNTER — APPOINTMENT (OUTPATIENT)
Dept: GENERAL RADIOLOGY | Age: 28
End: 2018-09-24
Attending: FAMILY MEDICINE
Payer: COMMERCIAL

## 2018-09-24 VITALS
OXYGEN SATURATION: 100 % | BODY MASS INDEX: 22.76 KG/M2 | HEART RATE: 80 BPM | RESPIRATION RATE: 16 BRPM | DIASTOLIC BLOOD PRESSURE: 86 MMHG | TEMPERATURE: 98 F | SYSTOLIC BLOOD PRESSURE: 126 MMHG | WEIGHT: 145 LBS | HEIGHT: 67 IN

## 2018-09-24 DIAGNOSIS — J98.01 ACUTE BRONCHOSPASM: ICD-10-CM

## 2018-09-24 DIAGNOSIS — J01.90 ACUTE SINUSITIS, RECURRENCE NOT SPECIFIED, UNSPECIFIED LOCATION: Primary | ICD-10-CM

## 2018-09-24 PROCEDURE — 71046 X-RAY EXAM CHEST 2 VIEWS: CPT | Performed by: FAMILY MEDICINE

## 2018-09-24 PROCEDURE — 99215 OFFICE O/P EST HI 40 MIN: CPT

## 2018-09-24 PROCEDURE — 99214 OFFICE O/P EST MOD 30 MIN: CPT

## 2018-09-24 PROCEDURE — 94640 AIRWAY INHALATION TREATMENT: CPT

## 2018-09-24 PROCEDURE — 93010 ELECTROCARDIOGRAM REPORT: CPT

## 2018-09-24 RX ORDER — ALBUTEROL SULFATE 90 UG/1
2 AEROSOL, METERED RESPIRATORY (INHALATION) EVERY 4 HOURS PRN
Qty: 1 INHALER | Refills: 0 | Status: SHIPPED | OUTPATIENT
Start: 2018-09-24 | End: 2018-10-24

## 2018-09-24 RX ORDER — METHYLPREDNISOLONE 4 MG/1
TABLET ORAL
Qty: 1 PACKAGE | Refills: 0 | Status: SHIPPED | OUTPATIENT
Start: 2018-09-24 | End: 2018-11-17 | Stop reason: ALTCHOICE

## 2018-09-24 RX ORDER — MULTIVITAMIN
1 TABLET ORAL DAILY
COMMUNITY

## 2018-09-24 RX ORDER — ALBUTEROL SULFATE 2.5 MG/3ML
2.5 SOLUTION RESPIRATORY (INHALATION) ONCE
Status: COMPLETED | OUTPATIENT
Start: 2018-09-24 | End: 2018-09-24

## 2018-09-24 NOTE — ED PROVIDER NOTES
Patient Seen in: [address]    History   Patient presents with:  Cough/URI    Stated Complaint: CONGESTION ,CHEST PRESSUE SHORTNESS OF BREATH X 4 DAYS    HPI    55-year-old female presently chief complaint of chest tightnes icterus  There is fluid level appreciated behind the right tympanic membrane. Left tympanic membrane normal.  Nose shows slight deviated nasal septum to the right some nasal congestion, turbinate hypertrophy with redness, clear discharge.   Oropharynx: No a week. If has any worsening symptoms, new symptoms or follow-up earlier go to the ER.             Disposition and Plan     Clinical Impression:  Acute sinusitis, recurrence not specified, unspecified location  (primary encounter diagnosis)  Acute bronchos

## 2018-09-24 NOTE — ED INITIAL ASSESSMENT (HPI)
About 3-4 days had sinus congestion and a dry cough. Cough has improved. Feels chest pain/pressure and heart racing starting today/sob with exertion. \"Feels like someone is sitting on my chest\". Took Dayquil this morning.

## 2018-11-13 ENCOUNTER — TELEPHONE (OUTPATIENT)
Dept: INTERNAL MEDICINE CLINIC | Facility: CLINIC | Age: 28
End: 2018-11-13

## 2018-11-13 NOTE — TELEPHONE ENCOUNTER
Late entry. Patient called at 7 AM this morning. The patient donated platelets last night 4:30 PM and did not feel well afterwards. She was lightheaded. She drank a liter of fluids and additional Gatorade.   When she got up this morning she felt nause

## 2018-11-16 ENCOUNTER — NURSE TRIAGE (OUTPATIENT)
Dept: INTERNAL MEDICINE CLINIC | Facility: CLINIC | Age: 28
End: 2018-11-16

## 2018-11-16 NOTE — TELEPHONE ENCOUNTER
Action Requested: Summary for Provider     []  Critical Lab, Recommendations Needed  [] Need Additional Advice  [x]   FYI    []   Need Orders  [] Need Medications Sent to Pharmacy  []  Other     SUMMARY: pt states for the last two weeks she has had a cough

## 2018-11-17 ENCOUNTER — OFFICE VISIT (OUTPATIENT)
Dept: FAMILY MEDICINE CLINIC | Facility: CLINIC | Age: 28
End: 2018-11-17
Payer: COMMERCIAL

## 2018-11-17 ENCOUNTER — HOSPITAL ENCOUNTER (OUTPATIENT)
Dept: GENERAL RADIOLOGY | Age: 28
Discharge: HOME | End: 2018-11-17
Attending: FAMILY MEDICINE
Payer: COMMERCIAL

## 2018-11-17 ENCOUNTER — TELEPHONE (OUTPATIENT)
Dept: OTHER | Age: 28
End: 2018-11-17

## 2018-11-17 VITALS
WEIGHT: 146 LBS | DIASTOLIC BLOOD PRESSURE: 72 MMHG | HEIGHT: 65.5 IN | TEMPERATURE: 99 F | SYSTOLIC BLOOD PRESSURE: 110 MMHG | BODY MASS INDEX: 24.03 KG/M2 | HEART RATE: 90 BPM

## 2018-11-17 DIAGNOSIS — R05.9 COUGH: ICD-10-CM

## 2018-11-17 DIAGNOSIS — R05.9 COUGH: Primary | ICD-10-CM

## 2018-11-17 PROCEDURE — 99212 OFFICE O/P EST SF 10 MIN: CPT | Performed by: FAMILY MEDICINE

## 2018-11-17 PROCEDURE — 99213 OFFICE O/P EST LOW 20 MIN: CPT | Performed by: FAMILY MEDICINE

## 2018-11-17 PROCEDURE — 71046 X-RAY EXAM CHEST 2 VIEWS: CPT | Performed by: FAMILY MEDICINE

## 2018-11-17 RX ORDER — CODEINE PHOSPHATE AND GUAIFENESIN 10; 100 MG/5ML; MG/5ML
10 SOLUTION ORAL EVERY 6 HOURS PRN
Qty: 240 ML | Refills: 0 | Status: SHIPPED | OUTPATIENT
Start: 2018-11-17 | End: 2019-05-22

## 2018-11-17 RX ORDER — AMOXICILLIN AND CLAVULANATE POTASSIUM 875; 125 MG/1; MG/1
1 TABLET, FILM COATED ORAL 2 TIMES DAILY
Qty: 20 TABLET | Refills: 0 | Status: SHIPPED | OUTPATIENT
Start: 2018-11-17 | End: 2018-11-27

## 2018-11-17 NOTE — TELEPHONE ENCOUNTER
Pt calling to see If you have xray results yet. Asked if script for augmentin was sent to pharmacy. Informed her sent electronically.

## 2018-11-17 NOTE — PROGRESS NOTES
Blood pressure 110/72, pulse 90, temperature 98.5 °F (36.9 °C), temperature source Oral, height 5' 5.5\" (1.664 m), weight 146 lb (66.2 kg), not currently breastfeeding. Patient presents today complaining of a cough on and off for 2 months.   Yellow phle

## 2018-11-17 NOTE — TELEPHONE ENCOUNTER
Spoke with patient, states that she has appointment for today 11/17/18 at 200 AM in [facility], advised that she is not scheduled for today, she was scheduled yesterday 11/16/18 at 1115 AM, states that she  did not asked for yesterday's appointment and sh

## 2019-01-14 ENCOUNTER — NURSE TRIAGE (OUTPATIENT)
Dept: OTHER | Age: 29
End: 2019-01-14

## 2019-01-14 ENCOUNTER — APPOINTMENT (OUTPATIENT)
Dept: CT IMAGING | Facility: HOSPITAL | Age: 29
End: 2019-01-14
Attending: EMERGENCY MEDICINE
Payer: COMMERCIAL

## 2019-01-14 ENCOUNTER — HOSPITAL ENCOUNTER (EMERGENCY)
Facility: HOSPITAL | Age: 29
Discharge: HOME | End: 2019-01-14
Attending: EMERGENCY MEDICINE
Payer: COMMERCIAL

## 2019-01-14 VITALS
DIASTOLIC BLOOD PRESSURE: 81 MMHG | OXYGEN SATURATION: 99 % | WEIGHT: 150 LBS | TEMPERATURE: 98 F | HEIGHT: 67 IN | RESPIRATION RATE: 20 BRPM | SYSTOLIC BLOOD PRESSURE: 117 MMHG | HEART RATE: 78 BPM | BODY MASS INDEX: 23.54 KG/M2

## 2019-01-14 DIAGNOSIS — N30.00 ACUTE CYSTITIS WITHOUT HEMATURIA: Primary | ICD-10-CM

## 2019-01-14 LAB
ALBUMIN SERPL BCP-MCNC: 3.9 G/DL (ref 3.5–4.8)
ALBUMIN/GLOB SERPL: 1.2 {RATIO} (ref 1–2)
ALP SERPL-CCNC: 41 U/L (ref 32–100)
ALT SERPL-CCNC: 13 U/L (ref 14–54)
ANION GAP SERPL CALC-SCNC: 13 MMOL/L (ref 0–18)
AST SERPL-CCNC: 25 U/L (ref 15–41)
B-HCG UR QL: NEGATIVE
BASOPHILS # BLD: 0.1 K/UL (ref 0–0.2)
BASOPHILS NFR BLD: 1 %
BILIRUB SERPL-MCNC: 0.3 MG/DL (ref 0.3–1.2)
BILIRUB UR QL: NEGATIVE
BUN SERPL-MCNC: 10 MG/DL (ref 8–20)
BUN/CREAT SERPL: 13.5 (ref 10–20)
CALCIUM SERPL-MCNC: 9.3 MG/DL (ref 8.5–10.5)
CHLORIDE SERPL-SCNC: 101 MMOL/L (ref 95–110)
CO2 SERPL-SCNC: 21 MMOL/L (ref 22–32)
COLOR UR: YELLOW
CREAT SERPL-MCNC: 0.74 MG/DL (ref 0.5–1.5)
EOSINOPHIL # BLD: 0.1 K/UL (ref 0–0.7)
EOSINOPHIL NFR BLD: 1 %
ERYTHROCYTE [DISTWIDTH] IN BLOOD BY AUTOMATED COUNT: 13.7 % (ref 11–15)
GLOBULIN PLAS-MCNC: 3.2 G/DL (ref 2.5–3.7)
GLUCOSE SERPL-MCNC: 90 MG/DL (ref 70–99)
GLUCOSE UR-MCNC: NEGATIVE MG/DL
HCT VFR BLD AUTO: 38.1 % (ref 35–48)
HGB BLD-MCNC: 12.6 G/DL (ref 12–16)
KETONES UR-MCNC: 20 MG/DL
LIPASE SERPL-CCNC: 25 U/L (ref 22–51)
LYMPHOCYTES # BLD: 2.5 K/UL (ref 1–4)
LYMPHOCYTES NFR BLD: 25 %
MCH RBC QN AUTO: 28.6 PG (ref 27–32)
MCHC RBC AUTO-ENTMCNC: 33.1 G/DL (ref 32–37)
MCV RBC AUTO: 86.4 FL (ref 80–100)
MONOCYTES # BLD: 0.5 K/UL (ref 0–1)
MONOCYTES NFR BLD: 5 %
NEUTROPHILS # BLD AUTO: 6.9 K/UL (ref 1.8–7.7)
NEUTROPHILS NFR BLD: 68 %
NITRITE UR QL STRIP.AUTO: NEGATIVE
OSMOLALITY UR CALC.SUM OF ELEC: 279 MOSM/KG (ref 275–295)
PH UR: 5 [PH] (ref 5–8)
PLATELET # BLD AUTO: 318 K/UL (ref 140–400)
PMV BLD AUTO: 9.2 FL (ref 7.4–10.3)
POTASSIUM SERPL-SCNC: 3.6 MMOL/L (ref 3.3–5.1)
PROT SERPL-MCNC: 7.1 G/DL (ref 5.9–8.4)
PROT UR-MCNC: NEGATIVE MG/DL
RBC # BLD AUTO: 4.4 M/UL (ref 3.7–5.4)
RBC #/AREA URNS AUTO: 3 /HPF
SODIUM SERPL-SCNC: 135 MMOL/L (ref 136–144)
SP GR UR STRIP: 1.03 (ref 1–1.03)
UROBILINOGEN UR STRIP-ACNC: <2
VIT C UR-MCNC: NEGATIVE MG/DL
WBC # BLD AUTO: 10.1 K/UL (ref 4–11)
WBC #/AREA URNS AUTO: 12 /HPF

## 2019-01-14 PROCEDURE — 81025 URINE PREGNANCY TEST: CPT

## 2019-01-14 PROCEDURE — 83690 ASSAY OF LIPASE: CPT

## 2019-01-14 PROCEDURE — 96360 HYDRATION IV INFUSION INIT: CPT

## 2019-01-14 PROCEDURE — 83690 ASSAY OF LIPASE: CPT | Performed by: EMERGENCY MEDICINE

## 2019-01-14 PROCEDURE — 74177 CT ABD & PELVIS W/CONTRAST: CPT | Performed by: EMERGENCY MEDICINE

## 2019-01-14 PROCEDURE — 85025 COMPLETE CBC W/AUTO DIFF WBC: CPT | Performed by: EMERGENCY MEDICINE

## 2019-01-14 PROCEDURE — 81001 URINALYSIS AUTO W/SCOPE: CPT | Performed by: EMERGENCY MEDICINE

## 2019-01-14 PROCEDURE — 87086 URINE CULTURE/COLONY COUNT: CPT | Performed by: EMERGENCY MEDICINE

## 2019-01-14 PROCEDURE — 99284 EMERGENCY DEPT VISIT MOD MDM: CPT

## 2019-01-14 PROCEDURE — 85025 COMPLETE CBC W/AUTO DIFF WBC: CPT

## 2019-01-14 PROCEDURE — 80053 COMPREHEN METABOLIC PANEL: CPT

## 2019-01-14 PROCEDURE — 80053 COMPREHEN METABOLIC PANEL: CPT | Performed by: EMERGENCY MEDICINE

## 2019-01-14 RX ORDER — DOCUSATE SODIUM 100 MG/1
100 CAPSULE, LIQUID FILLED ORAL 2 TIMES DAILY PRN
Qty: 60 CAPSULE | Refills: 0 | Status: SHIPPED | OUTPATIENT
Start: 2019-01-14 | End: 2019-02-13

## 2019-01-14 RX ORDER — CEPHALEXIN 500 MG/1
500 CAPSULE ORAL 2 TIMES DAILY
Qty: 14 CAPSULE | Refills: 0 | Status: SHIPPED | OUTPATIENT
Start: 2019-01-14 | End: 2019-01-21

## 2019-01-14 NOTE — TELEPHONE ENCOUNTER
Action Requested: Summary for Provider     []  Critical Lab, Recommendations Needed  [] Need Additional Advice  []   FYI    []   Need Orders  [] Need Medications Sent to Pharmacy  []  Other     SUMMARY: ER f/u 1/15   Pt stated that she has been having Joelle Reeves

## 2019-01-14 NOTE — ED INITIAL ASSESSMENT (HPI)
Pt to ER with c/o worsening umbilical/abdomen pain since yesterday. Pt denies diarrhea. Pt last normal BM 2-3 days ago. +passing gas. Pt denies dysuria or hematuria. +occasional nausea. Pt denies fall or injury. Pt denies fever at home.

## 2019-01-15 NOTE — ED PROVIDER NOTES
Patient Seen in: [facility] AND [facility] Emergency Department    History   Patient presents with:  Abdomen/Flank Pain (GI/)    Stated Complaint: umbilical pain since yesterday    HPI     33year old female with h/o biliary colic who presents with progressive Eyes: Conjunctivae and EOM are normal. Pupils are equal, round, and reactive to light. Neck: Normal range of motion and full passive range of motion without pain. Neck supple. No neck rigidity. Normal range of motion present.    Cardiovascular: Normal r ------                     CBC W/ DIFFERENTIAL[[id number]]                              Final result                 Please view results for these tests on the individual orders.    RAINBOW DRAW BLUE   RAINBOW DRAW LAVENDER   RAINBOW DRAW DARK GREEN

## 2019-03-20 ENCOUNTER — TELEPHONE (OUTPATIENT)
Dept: OBGYN CLINIC | Facility: CLINIC | Age: 29
End: 2019-03-20

## 2019-03-20 NOTE — TELEPHONE ENCOUNTER
Pt's last annual was 8/23/18. She was given 3 months with 4 refills at that appt. Pt should have refills. Pt states she received 3 months at a time. She lost one of the packs so she will run out before her next refill will be ready.   Pt is asking for o

## 2019-03-20 NOTE — TELEPHONE ENCOUNTER
Pt states that she lost her last pack of her birth control med., and is requesting to get a Rx sent to her [facility].        Current Outpatient Medications:              Drospirenone-Ethinyl Estradiol (LORYNA) 3-0.02 MG Oral Tab Loryna take one tablet

## 2019-03-21 RX ORDER — DROSPIRENONE AND ETHINYL ESTRADIOL 0.02-3(28)
KIT ORAL
Qty: 3 PACKAGE | Refills: 4 | Status: SHIPPED | OUTPATIENT
Start: 2019-03-21 | End: 2019-03-21

## 2019-03-21 RX ORDER — DROSPIRENONE AND ETHINYL ESTRADIOL 0.02-3(28)
1 KIT ORAL DAILY
Qty: 1 PACKAGE | Refills: 0 | Status: SHIPPED | OUTPATIENT
Start: 2019-03-21 | End: 2019-10-22

## 2019-05-22 ENCOUNTER — HOSPITAL ENCOUNTER (OUTPATIENT)
Age: 29
Discharge: HOME | End: 2019-05-22
Payer: COMMERCIAL

## 2019-05-22 VITALS
HEART RATE: 80 BPM | TEMPERATURE: 98 F | WEIGHT: 150 LBS | DIASTOLIC BLOOD PRESSURE: 77 MMHG | RESPIRATION RATE: 16 BRPM | OXYGEN SATURATION: 100 % | BODY MASS INDEX: 23 KG/M2 | SYSTOLIC BLOOD PRESSURE: 116 MMHG

## 2019-05-22 DIAGNOSIS — J06.9 UPPER RESPIRATORY TRACT INFECTION, UNSPECIFIED TYPE: Primary | ICD-10-CM

## 2019-05-22 PROCEDURE — 99213 OFFICE O/P EST LOW 20 MIN: CPT

## 2019-05-22 PROCEDURE — 99214 OFFICE O/P EST MOD 30 MIN: CPT

## 2019-05-22 RX ORDER — AMOXICILLIN AND CLAVULANATE POTASSIUM 875; 125 MG/1; MG/1
1 TABLET, FILM COATED ORAL 2 TIMES DAILY
Qty: 14 TABLET | Refills: 0 | Status: SHIPPED | OUTPATIENT
Start: 2019-05-22 | End: 2019-05-29

## 2019-05-22 NOTE — ED PROVIDER NOTES
Patient Seen in: [address]    History   Patient presents with:  Cough/URI    Stated Complaint: sinus problem    HPI    52-year-old otherwise healthy female presents for evaluation of possible sinus infection.   Patient r range of motion. Neurological: She is alert. Skin: Skin is warm. Capillary refill takes less than 2 seconds. Psychiatric: She has a normal mood and affect.              ED Course   Labs Reviewed - No data to display           MDM   Well-appearing daphne

## 2019-05-22 NOTE — ED INITIAL ASSESSMENT (HPI)
Pt presents to the IC with c/o a possible sinus infection. URI symptoms since Friday with cough, body aches, sore throat, nasal congestion. Cough was productive. Pt started sudafed yesterday and today with minimal relief. +low grade temps the first 2 days.

## 2019-07-19 ENCOUNTER — HOSPITAL ENCOUNTER (EMERGENCY)
Facility: HOSPITAL | Age: 29
Discharge: HOME | End: 2019-07-19
Attending: EMERGENCY MEDICINE
Payer: COMMERCIAL

## 2019-07-19 ENCOUNTER — APPOINTMENT (OUTPATIENT)
Dept: CT IMAGING | Facility: HOSPITAL | Age: 29
End: 2019-07-19
Attending: EMERGENCY MEDICINE
Payer: COMMERCIAL

## 2019-07-19 VITALS
SYSTOLIC BLOOD PRESSURE: 118 MMHG | BODY MASS INDEX: 24.33 KG/M2 | RESPIRATION RATE: 20 BRPM | OXYGEN SATURATION: 98 % | WEIGHT: 155 LBS | HEART RATE: 99 BPM | DIASTOLIC BLOOD PRESSURE: 80 MMHG | HEIGHT: 67 IN | TEMPERATURE: 98 F

## 2019-07-19 DIAGNOSIS — R10.9 ABDOMINAL PAIN OF UNKNOWN ETIOLOGY: Primary | ICD-10-CM

## 2019-07-19 DIAGNOSIS — K59.00 CONSTIPATION, UNSPECIFIED CONSTIPATION TYPE: ICD-10-CM

## 2019-07-19 LAB
ALBUMIN SERPL-MCNC: 3.3 G/DL (ref 3.4–5)
ALP LIVER SERPL-CCNC: 56 U/L (ref 37–98)
ALT SERPL-CCNC: 17 U/L (ref 13–56)
ANION GAP SERPL CALC-SCNC: 12 MMOL/L (ref 0–18)
AST SERPL-CCNC: 13 U/L (ref 15–37)
B-HCG UR QL: NEGATIVE
BASOPHILS # BLD AUTO: 0.05 X10(3) UL (ref 0–0.2)
BASOPHILS NFR BLD AUTO: 0.5 %
BILIRUB DIRECT SERPL-MCNC: <0.1 MG/DL (ref 0–0.2)
BILIRUB SERPL-MCNC: <0.1 MG/DL (ref 0.1–2)
BILIRUB UR QL: NEGATIVE
BUN BLD-MCNC: 13 MG/DL (ref 7–18)
BUN/CREAT SERPL: 17.1 (ref 10–20)
CALCIUM BLD-MCNC: 8.4 MG/DL (ref 8.5–10.1)
CHLORIDE SERPL-SCNC: 114 MMOL/L (ref 98–112)
CLARITY UR: CLEAR
CO2 SERPL-SCNC: 22 MMOL/L (ref 21–32)
COLOR UR: YELLOW
CREAT BLD-MCNC: 0.76 MG/DL (ref 0.55–1.02)
DEPRECATED RDW RBC AUTO: 44.4 FL (ref 35.1–46.3)
EOSINOPHIL # BLD AUTO: 0.22 X10(3) UL (ref 0–0.7)
EOSINOPHIL NFR BLD AUTO: 2.3 %
ERYTHROCYTE [DISTWIDTH] IN BLOOD BY AUTOMATED COUNT: 14.3 % (ref 11–15)
GLUCOSE BLD-MCNC: 110 MG/DL (ref 70–99)
GLUCOSE UR-MCNC: NEGATIVE MG/DL
HCT VFR BLD AUTO: 36.8 % (ref 35–48)
HGB BLD-MCNC: 12 G/DL (ref 12–16)
IMM GRANULOCYTES # BLD AUTO: 0.02 X10(3) UL (ref 0–1)
IMM GRANULOCYTES NFR BLD: 0.2 %
KETONES UR-MCNC: NEGATIVE MG/DL
LIPASE SERPL-CCNC: 116 U/L (ref 73–393)
LYMPHOCYTES # BLD AUTO: 1.27 X10(3) UL (ref 1–4)
LYMPHOCYTES NFR BLD AUTO: 13.2 %
M PROTEIN MFR SERPL ELPH: 7.1 G/DL (ref 6.4–8.2)
MCH RBC QN AUTO: 27.8 PG (ref 26–34)
MCHC RBC AUTO-ENTMCNC: 32.6 G/DL (ref 31–37)
MCV RBC AUTO: 85.2 FL (ref 80–100)
MONOCYTES # BLD AUTO: 0.89 X10(3) UL (ref 0.1–1)
MONOCYTES NFR BLD AUTO: 9.3 %
NEUTROPHILS # BLD AUTO: 7.15 X10 (3) UL (ref 1.5–7.7)
NEUTROPHILS # BLD AUTO: 7.15 X10(3) UL (ref 1.5–7.7)
NEUTROPHILS NFR BLD AUTO: 74.5 %
NITRITE UR QL STRIP.AUTO: NEGATIVE
OSMOLALITY SERPL CALC.SUM OF ELEC: 307 MOSM/KG (ref 275–295)
PH UR: 5 [PH] (ref 5–8)
PLATELET # BLD AUTO: 347 10(3)UL (ref 150–450)
POTASSIUM SERPL-SCNC: 4 MMOL/L (ref 3.5–5.1)
PROT UR-MCNC: NEGATIVE MG/DL
RBC # BLD AUTO: 4.32 X10(6)UL (ref 3.8–5.3)
RBC #/AREA URNS AUTO: 5 /HPF
SODIUM SERPL-SCNC: 148 MMOL/L (ref 136–145)
SP GR UR STRIP: 1.02 (ref 1–1.03)
UROBILINOGEN UR STRIP-ACNC: <2
VIT C UR-MCNC: NEGATIVE MG/DL
WBC # BLD AUTO: 9.6 X10(3) UL (ref 4–11)
WBC #/AREA URNS AUTO: 1 /HPF

## 2019-07-19 PROCEDURE — 96374 THER/PROPH/DIAG INJ IV PUSH: CPT

## 2019-07-19 PROCEDURE — 81001 URINALYSIS AUTO W/SCOPE: CPT | Performed by: EMERGENCY MEDICINE

## 2019-07-19 PROCEDURE — 85025 COMPLETE CBC W/AUTO DIFF WBC: CPT | Performed by: EMERGENCY MEDICINE

## 2019-07-19 PROCEDURE — 96376 TX/PRO/DX INJ SAME DRUG ADON: CPT

## 2019-07-19 PROCEDURE — 81025 URINE PREGNANCY TEST: CPT

## 2019-07-19 PROCEDURE — 74177 CT ABD & PELVIS W/CONTRAST: CPT | Performed by: EMERGENCY MEDICINE

## 2019-07-19 PROCEDURE — 83690 ASSAY OF LIPASE: CPT | Performed by: EMERGENCY MEDICINE

## 2019-07-19 PROCEDURE — 80076 HEPATIC FUNCTION PANEL: CPT | Performed by: EMERGENCY MEDICINE

## 2019-07-19 PROCEDURE — 96361 HYDRATE IV INFUSION ADD-ON: CPT

## 2019-07-19 PROCEDURE — 80048 BASIC METABOLIC PNL TOTAL CA: CPT | Performed by: EMERGENCY MEDICINE

## 2019-07-19 PROCEDURE — 99284 EMERGENCY DEPT VISIT MOD MDM: CPT

## 2019-07-19 RX ORDER — ONDANSETRON 4 MG/1
4 TABLET, ORALLY DISINTEGRATING ORAL EVERY 6 HOURS PRN
Qty: 10 TABLET | Refills: 0 | Status: SHIPPED | OUTPATIENT
Start: 2019-07-19 | End: 2019-07-26

## 2019-07-19 RX ORDER — ONDANSETRON 2 MG/ML
4 INJECTION INTRAMUSCULAR; INTRAVENOUS ONCE
Status: COMPLETED | OUTPATIENT
Start: 2019-07-19 | End: 2019-07-19

## 2019-07-19 NOTE — ED PROVIDER NOTES
Patient Seen in: [facility] AND [facility] Emergency Department    History   Patient presents with:  Abdominal Pain    Stated Complaint: Periumbilical and RLQ pain, vomiting    HPI    34 yo female has had several weeks of nausea and intermittent diarrhea then co Abdominal: Soft. Bowel sounds are normal. She exhibits no distension and no mass. There is tenderness in the right lower quadrant. There is no rebound and no guarding. Musculoskeletal: Normal range of motion. She exhibits no edema or tenderness.    Lymp contrast    IMPRESSION:  No acute intra-abdominal pathology  Normal appendix    No acute bowel process  Prominent stool throughout most of the colon. Correlate for history of constipation      Gallbladder, pancreas, spleen, and kidneys are unremarkable  No

## 2019-07-19 NOTE — ED INITIAL ASSESSMENT (HPI)
Pt comes to the ER with c/o periumbilical pain that radiates to RLQ of abd with nausea and vomiting. Pt states \"I think there's something wrong with my appendix\"  Now pt states she has been nauseous for weeks.

## 2019-07-23 ENCOUNTER — TELEPHONE (OUTPATIENT)
Dept: INTERNAL MEDICINE CLINIC | Facility: CLINIC | Age: 29
End: 2019-07-23

## 2019-07-26 ENCOUNTER — OFFICE VISIT (OUTPATIENT)
Dept: INTERNAL MEDICINE CLINIC | Facility: CLINIC | Age: 29
End: 2019-07-26
Payer: COMMERCIAL

## 2019-07-26 VITALS
DIASTOLIC BLOOD PRESSURE: 86 MMHG | HEIGHT: 65.5 IN | WEIGHT: 151.63 LBS | HEART RATE: 88 BPM | BODY MASS INDEX: 24.96 KG/M2 | SYSTOLIC BLOOD PRESSURE: 120 MMHG | TEMPERATURE: 99 F | RESPIRATION RATE: 20 BRPM

## 2019-07-26 DIAGNOSIS — R10.9 ABDOMINAL PAIN, UNSPECIFIED ABDOMINAL LOCATION: Primary | ICD-10-CM

## 2019-07-26 DIAGNOSIS — K58.9 IRRITABLE BOWEL SYNDROME, UNSPECIFIED TYPE: ICD-10-CM

## 2019-07-26 PROCEDURE — 99213 OFFICE O/P EST LOW 20 MIN: CPT | Performed by: INTERNAL MEDICINE

## 2019-08-12 NOTE — PROGRESS NOTES
HPI:    Patient ID: Matt Kent is a 30year old female. HPI  Patient is here for follow-up on emergency room visit 7 days ago. For over a month she had issues with digestion. Appetite is waxed and waned.   She goes between diarrhea and const Comment:Other reaction(s): GI irritation, dizziness  Phenylephrine               Comment:Other reaction(s): PHENYLEPHRINE HCL,LETHARGIC     PHYSICAL EXAM:   /86 (BP Location: Right arm, Patient Position: Sitting, Cuff Size: large)   Pulse 88   Temp 9

## 2019-09-30 ENCOUNTER — OFFICE VISIT (OUTPATIENT)
Dept: GASTROENTEROLOGY | Facility: CLINIC | Age: 29
End: 2019-09-30
Payer: COMMERCIAL

## 2019-09-30 ENCOUNTER — LAB ENCOUNTER (OUTPATIENT)
Dept: LAB | Facility: HOSPITAL | Age: 29
End: 2019-09-30
Attending: INTERNAL MEDICINE
Payer: COMMERCIAL

## 2019-09-30 ENCOUNTER — TELEPHONE (OUTPATIENT)
Dept: OBGYN CLINIC | Facility: CLINIC | Age: 29
End: 2019-09-30

## 2019-09-30 VITALS
SYSTOLIC BLOOD PRESSURE: 121 MMHG | HEIGHT: 65.5 IN | WEIGHT: 158 LBS | DIASTOLIC BLOOD PRESSURE: 83 MMHG | HEART RATE: 81 BPM | BODY MASS INDEX: 26.01 KG/M2

## 2019-09-30 DIAGNOSIS — R10.30 LOWER ABDOMINAL PAIN: ICD-10-CM

## 2019-09-30 DIAGNOSIS — K59.00 CONSTIPATION, UNSPECIFIED CONSTIPATION TYPE: ICD-10-CM

## 2019-09-30 DIAGNOSIS — Z87.898 HISTORY OF ULCER DISEASE: ICD-10-CM

## 2019-09-30 DIAGNOSIS — R10.30 LOWER ABDOMINAL PAIN: Primary | ICD-10-CM

## 2019-09-30 PROCEDURE — 83516 IMMUNOASSAY NONANTIBODY: CPT

## 2019-09-30 PROCEDURE — 84443 ASSAY THYROID STIM HORMONE: CPT

## 2019-09-30 PROCEDURE — 83013 H PYLORI (C-13) BREATH: CPT

## 2019-09-30 PROCEDURE — 36415 COLL VENOUS BLD VENIPUNCTURE: CPT

## 2019-09-30 PROCEDURE — 99244 OFF/OP CNSLTJ NEW/EST MOD 40: CPT | Performed by: INTERNAL MEDICINE

## 2019-09-30 PROCEDURE — 82784 ASSAY IGA/IGD/IGG/IGM EACH: CPT

## 2019-09-30 NOTE — TELEPHONE ENCOUNTER
Received refill request for OCP. Request fax backed denied.  Pt needs appt for annual exam.     Last annual= 8/23/18

## 2019-09-30 NOTE — H&P
[address] [facility] [address] Gastroenterology  Clinic History and Physical     Patient presents with:  Consult: referred by Dr. Niya Bishop; combination of frequent BMs and then no BMs; better with fiber; nausea for hours       HPI:   Misael Oconnor is a 29 year standard drinks      Types: 2 Glasses of wine per week      Comment: OCC    Drug use: No       Medications (Active prior to today's visit):    Current Outpatient Medications:  Wheat Dextrin (BENEFIBER OR) Take by mouth.  Disp:  Rfl:    Drospirenone-Ethinyl imaging were reviewed and discussed with patient today.       Recent Labs     09/05/17  1147 01/14/19  1750 07/19/19  0250   RBC 4.44 4.40 4.32   HGB 12.8 12.6 12.0   HCT 37.9 38.1 36.8   MCV 85.4 86.4 85.2   MCH 28.9 28.6 27.8   MCHC 33.8 33.1 32.6   RDW 1 Adult      TSH [E]      Meds This Visit:  Requested Prescriptions      No prescriptions requested or ordered in this encounter       Imaging & Referrals:  None         Tripp Blackmon MD  9/30/2019

## 2019-09-30 NOTE — PATIENT INSTRUCTIONS
Discussed EGD vs labs and workup. At this time will plan labs.    Will plan labs: thyroid, celiac, and   Low FODMAP  Can take miralax when no bowel movement in 1-2 days

## 2019-10-22 ENCOUNTER — OFFICE VISIT (OUTPATIENT)
Dept: OBGYN CLINIC | Facility: CLINIC | Age: 29
End: 2019-10-22
Payer: COMMERCIAL

## 2019-10-22 VITALS
DIASTOLIC BLOOD PRESSURE: 88 MMHG | WEIGHT: 160.19 LBS | BODY MASS INDEX: 26 KG/M2 | SYSTOLIC BLOOD PRESSURE: 126 MMHG | HEART RATE: 112 BPM

## 2019-10-22 DIAGNOSIS — Z30.41 ENCOUNTER FOR BIRTH CONTROL PILLS MAINTENANCE: ICD-10-CM

## 2019-10-22 DIAGNOSIS — Z01.419 ENCOUNTER FOR GYNECOLOGICAL EXAMINATION WITHOUT ABNORMAL FINDING: Primary | ICD-10-CM

## 2019-10-22 PROCEDURE — 99395 PREV VISIT EST AGE 18-39: CPT | Performed by: OBSTETRICS & GYNECOLOGY

## 2019-10-22 RX ORDER — DROSPIRENONE AND ETHINYL ESTRADIOL 0.02-3(28)
1 KIT ORAL DAILY
Qty: 3 PACKAGE | Refills: 4 | Status: SHIPPED | OUTPATIENT
Start: 2019-10-22 | End: 2020-09-21

## 2019-10-22 NOTE — PROGRESS NOTES
Well Woman Exam    HPI:  The patient is a 34yo female who presents today for annual exam. She denies complaints. She has been more dry during intercourse and discussed lubricant. She is liking OCPs and no complaints with them.      Reviewed medical and surg file        Active member of club or organization: Not on file        Attends meetings of clubs or organizations: Not on file        Relationship status: Not on file      Intimate partner violence:        Fear of current or ex partner: Not on file        E ALLERGIES:    Nitrofurantoin              Comment:Other reaction(s): GI irritation, dizziness  Phenylephrine               Comment:Other reaction(s): PHENYLEPHRINE HCL,LETHARGIC      Review of Systems:  Constitutional:  Denies fevers and chills   Cardi to start mammograms at age 36; however, ACOG encourages shared decision making with the patient and together we reach appropriate screening intervals for the individual  2. Reviewed breast self awareness   4. Lubrication with intercourse  1.  Call if other

## 2019-10-28 ENCOUNTER — OFFICE VISIT (OUTPATIENT)
Dept: INTERNAL MEDICINE CLINIC | Facility: CLINIC | Age: 29
End: 2019-10-28
Payer: COMMERCIAL

## 2019-10-28 VITALS
TEMPERATURE: 99 F | WEIGHT: 156.13 LBS | DIASTOLIC BLOOD PRESSURE: 84 MMHG | HEART RATE: 86 BPM | HEIGHT: 65.5 IN | BODY MASS INDEX: 25.7 KG/M2 | SYSTOLIC BLOOD PRESSURE: 132 MMHG

## 2019-10-28 DIAGNOSIS — J06.9 ACUTE URI: Primary | ICD-10-CM

## 2019-10-28 PROCEDURE — 99213 OFFICE O/P EST LOW 20 MIN: CPT | Performed by: INTERNAL MEDICINE

## 2019-10-28 RX ORDER — AMOXICILLIN AND CLAVULANATE POTASSIUM 875; 125 MG/1; MG/1
1 TABLET, FILM COATED ORAL 2 TIMES DAILY WITH MEALS
Qty: 14 TABLET | Refills: 0 | Status: SHIPPED | OUTPATIENT
Start: 2019-10-28 | End: 2019-11-04

## 2019-10-28 NOTE — PROGRESS NOTES
Tamara Robin is a 30year old female.  Patient presents with:  Sinus Problem    HPI:   Since last Sunday, 8 days ago, she has had waxing and waning symptoms of nasal congestion, bilateral frontal more so than bilateral maxillary sinus pressure and conjunctiva pink, canals and TMs normal bilaterally, turbinates mildly swollen bilaterally without purulent drainage, bilateral maxillary sinus tenderness without frontal sinus tenderness, oropharynx normal without erythema ulceration swelling or exudate

## 2019-10-28 NOTE — PATIENT INSTRUCTIONS
Monitor symptoms closely. Take Augmentin 875 mg twice daily with meals for 7 days if symptoms do not continue to go away. Call if no better.

## 2020-02-07 ENCOUNTER — HOSPITAL ENCOUNTER (OUTPATIENT)
Age: 30
Discharge: HOME | End: 2020-02-07
Attending: EMERGENCY MEDICINE
Payer: COMMERCIAL

## 2020-02-07 VITALS
SYSTOLIC BLOOD PRESSURE: 127 MMHG | BODY MASS INDEX: 24.48 KG/M2 | HEART RATE: 82 BPM | RESPIRATION RATE: 16 BRPM | WEIGHT: 156 LBS | OXYGEN SATURATION: 99 % | DIASTOLIC BLOOD PRESSURE: 85 MMHG | HEIGHT: 67 IN | TEMPERATURE: 99 F

## 2020-02-07 DIAGNOSIS — H53.9 VISUAL DISTURBANCE: Primary | ICD-10-CM

## 2020-02-07 PROCEDURE — 99212 OFFICE O/P EST SF 10 MIN: CPT

## 2020-02-07 PROCEDURE — 99213 OFFICE O/P EST LOW 20 MIN: CPT

## 2020-02-07 NOTE — ED PROVIDER NOTES
Patient Seen in: [address] [facility]      History   Patient presents with: Eye Visual Problem    Stated Complaint: loss of vision     HPI    Patient complains of visual disturbance.   The visual disturbance started just about 30 min Device None (Room air)       Current:/85   Pulse 82   Temp 98.9 °F (37.2 °C) (Temporal)   Resp 16   Ht 170.2 cm (5' 7\")   Wt 70.8 kg   LMP 01/17/2020   SpO2 99%   BMI 24.43 kg/m²     Right Eye Chart Acuity: 20/30, Uncorrected  Left Eye Chart Acuity:

## 2020-02-07 NOTE — ED INITIAL ASSESSMENT (HPI)
When patient returned from the bathroom, pt's c/o loss of vision in the right eye for the past 15 minutes. States vision is blurry/foggy with no peripheral vision. Also has a headache and dizziness.

## 2020-06-15 NOTE — ED AVS SNAPSHOT
Can you ask NH how much fluid she is drinking each day please.   Chance Obiholly   MRN: [id number]    Department:  [facility] Emergency Department   Date of Visit:  1/14/2019           Disclosure     Insurance plans vary and the physician(s) referred by the ER may not be covered by your plan.  Please co CARE PHYSICIAN AT ONCE OR RETURN IMMEDIATELY TO THE EMERGENCY DEPARTMENT. If you have been prescribed any medication(s), please fill your prescription right away and begin taking the medication(s) as directed.   If you believe that any of the medications

## 2020-08-23 ENCOUNTER — APPOINTMENT (OUTPATIENT)
Dept: GENERAL RADIOLOGY | Age: 30
End: 2020-08-23
Attending: EMERGENCY MEDICINE
Payer: COMMERCIAL

## 2020-08-23 ENCOUNTER — HOSPITAL ENCOUNTER (OUTPATIENT)
Age: 30
Discharge: HOME | End: 2020-08-23
Payer: COMMERCIAL

## 2020-08-23 VITALS
WEIGHT: 155 LBS | HEIGHT: 67 IN | OXYGEN SATURATION: 100 % | SYSTOLIC BLOOD PRESSURE: 133 MMHG | TEMPERATURE: 98 F | HEART RATE: 96 BPM | BODY MASS INDEX: 24.33 KG/M2 | RESPIRATION RATE: 16 BRPM | DIASTOLIC BLOOD PRESSURE: 83 MMHG

## 2020-08-23 DIAGNOSIS — Z20.822 ENCOUNTER FOR SCREENING LABORATORY TESTING FOR COVID-19 VIRUS: Primary | ICD-10-CM

## 2020-08-23 DIAGNOSIS — R05.9 COUGH: ICD-10-CM

## 2020-08-23 DIAGNOSIS — J02.9 SORE THROAT: ICD-10-CM

## 2020-08-23 LAB — S PYO AG THROAT QL: NEGATIVE

## 2020-08-23 PROCEDURE — 99202 OFFICE O/P NEW SF 15 MIN: CPT | Performed by: EMERGENCY MEDICINE

## 2020-08-23 PROCEDURE — 87430 STREP A AG IA: CPT | Performed by: EMERGENCY MEDICINE

## 2020-08-23 PROCEDURE — U0003 INFECTIOUS AGENT DETECTION BY NUCLEIC ACID (DNA OR RNA); SEVERE ACUTE RESPIRATORY SYNDROME CORONAVIRUS 2 (SARS-COV-2) (CORONAVIRUS DISEASE [COVID-19]), AMPLIFIED PROBE TECHNIQUE, MAKING USE OF HIGH THROUGHPUT TECHNOLOGIES AS DESCRIBED BY CMS-2020-01-R: HCPCS | Performed by: EMERGENCY MEDICINE

## 2020-08-23 PROCEDURE — 71046 X-RAY EXAM CHEST 2 VIEWS: CPT | Performed by: EMERGENCY MEDICINE

## 2020-08-23 RX ORDER — ALBUTEROL SULFATE 90 UG/1
AEROSOL, METERED RESPIRATORY (INHALATION)
Qty: 1 INHALER | Refills: 0 | Status: SHIPPED | OUTPATIENT
Start: 2020-08-23 | End: 2020-11-05

## 2020-08-23 RX ORDER — AZITHROMYCIN 250 MG/1
TABLET, FILM COATED ORAL
Qty: 6 TABLET | Refills: 0 | Status: SHIPPED | OUTPATIENT
Start: 2020-08-23 | End: 2020-08-28

## 2020-08-23 NOTE — ED PROVIDER NOTES
Patient Seen in: [address]      History   Patient presents with:  Testing    Stated Complaint: TL-URI symptoms     Kaushik Meadows is a 35year old  female here for sore throat, sinus congestion, and covid testing is not ill-appearing. HENT:      Head: Normocephalic. Right Ear: External ear normal.      Left Ear: External ear normal.      Nose: No congestion.       Mouth/Throat:      Mouth: Mucous membranes are moist.      Pharynx: Posterior oropharyngeal eryt airways clear, and no signs of compromise. Patient meets our current criteria to test for COVID-19. Will provide strict ER precautions and instructions on self-isolation/quarantine and anticipatory guidance.   Other than presenting complaint, exam was u

## 2020-08-23 NOTE — ED INITIAL ASSESSMENT (HPI)
Pt c/o nasal congestion, chest pressure and sore throat x3 days, chills and sweats last night. No strep or covid exposure. No fever. No cough. Pt requesting covid testing. No falls or injury. Awake/alert. Breathing easy and even without distress.  Sp

## 2020-08-24 LAB — SARS-COV-2 RNA RESP QL NAA+PROBE: NOT DETECTED

## 2020-09-21 ENCOUNTER — TELEPHONE (OUTPATIENT)
Dept: OBGYN CLINIC | Facility: CLINIC | Age: 30
End: 2020-09-21

## 2020-09-21 RX ORDER — DROSPIRENONE AND ETHINYL ESTRADIOL 0.02-3(28)
1 KIT ORAL DAILY
Qty: 3 PACKAGE | Refills: 0 | Status: SHIPPED | OUTPATIENT
Start: 2020-09-21 | End: 2020-11-05

## 2020-09-21 NOTE — TELEPHONE ENCOUNTER
Patient's birth control will run out by week's end 9/25. Has her an annual with Dr Irasema Miranda on Thursday 11/5. Please advise.     Current Outpatient Medications   Medication Sig Dispense Refill   • Drospirenone-Ethinyl Estradiol (LORYNA) 3-0.02 MG Oral T

## 2020-09-21 NOTE — TELEPHONE ENCOUNTER
Informed pt OCP rx will be sent to cover her to next annual on 11/5/20. Last annual was 10/2019.  3/2017 Normal Pap.

## 2020-11-05 ENCOUNTER — OFFICE VISIT (OUTPATIENT)
Dept: OBGYN CLINIC | Facility: CLINIC | Age: 30
End: 2020-11-05
Payer: COMMERCIAL

## 2020-11-05 VITALS
WEIGHT: 161 LBS | BODY MASS INDEX: 26.19 KG/M2 | DIASTOLIC BLOOD PRESSURE: 77 MMHG | SYSTOLIC BLOOD PRESSURE: 128 MMHG | HEART RATE: 91 BPM | HEIGHT: 65.7 IN

## 2020-11-05 DIAGNOSIS — Z01.419 ENCOUNTER FOR GYNECOLOGICAL EXAMINATION WITHOUT ABNORMAL FINDING: Primary | ICD-10-CM

## 2020-11-05 DIAGNOSIS — Z12.4 CERVICAL CANCER SCREENING: ICD-10-CM

## 2020-11-05 DIAGNOSIS — Z30.41 ENCOUNTER FOR BIRTH CONTROL PILLS MAINTENANCE: ICD-10-CM

## 2020-11-05 PROCEDURE — 3074F SYST BP LT 130 MM HG: CPT | Performed by: OBSTETRICS & GYNECOLOGY

## 2020-11-05 PROCEDURE — 3008F BODY MASS INDEX DOCD: CPT | Performed by: OBSTETRICS & GYNECOLOGY

## 2020-11-05 PROCEDURE — 3078F DIAST BP <80 MM HG: CPT | Performed by: OBSTETRICS & GYNECOLOGY

## 2020-11-05 PROCEDURE — 99395 PREV VISIT EST AGE 18-39: CPT | Performed by: OBSTETRICS & GYNECOLOGY

## 2020-11-05 PROCEDURE — 99072 ADDL SUPL MATRL&STAF TM PHE: CPT | Performed by: OBSTETRICS & GYNECOLOGY

## 2020-11-05 RX ORDER — DROSPIRENONE AND ETHINYL ESTRADIOL 0.02-3(28)
1 KIT ORAL DAILY
Qty: 3 PACKAGE | Refills: 4 | Status: SHIPPED | OUTPATIENT
Start: 2020-11-05

## 2020-11-05 NOTE — PROGRESS NOTES
Well Woman Exam    HPI:  The patient is a 35yo female who presents today for annual exam. She is doing well on OCPs but worried about weight gain. She feels she has gained more weight this year.  Her mother was diagnosed with what sounds like endometrial hy on file        Attends [employer]op[employer] service: Not on file        Active member of club or organization: Not on file        Attends meetings of clubs or organizations: Not on file        Relationship status: Not on file      Intimate partner violence        [facility] mouth daily. , Disp: , Rfl:     ALLERGIES:    Nitrofurantoin              Comment:Other reaction(s): GI irritation, dizziness  Phenylephrine               Comment:Other reaction(s): PHENYLEPHRINE HCL,LETHARGIC      Review of Systems:  Constitutional:  Dorita Ash 3. Breast Health:     1. Reviewed current guidelines with recommendation to start mammograms at age 36; however, ACOG encourages shared decision making with the patient and together we reach appropriate screening intervals for the individual  2.  Reviewed

## 2020-11-24 ENCOUNTER — HOSPITAL ENCOUNTER (OUTPATIENT)
Age: 30
Discharge: HOME | End: 2020-11-24
Payer: COMMERCIAL

## 2020-11-24 VITALS
OXYGEN SATURATION: 100 % | DIASTOLIC BLOOD PRESSURE: 85 MMHG | RESPIRATION RATE: 16 BRPM | SYSTOLIC BLOOD PRESSURE: 138 MMHG | HEART RATE: 94 BPM | TEMPERATURE: 98 F

## 2020-11-24 DIAGNOSIS — Z20.822 ENCOUNTER FOR LABORATORY TESTING FOR COVID-19 VIRUS: Primary | ICD-10-CM

## 2020-11-24 PROCEDURE — 99213 OFFICE O/P EST LOW 20 MIN: CPT | Performed by: NURSE PRACTITIONER

## 2020-11-24 NOTE — ED PROVIDER NOTES
Patient presents with:  Testing      HPI:     Selvin Padilla is a 35year old female who presents for a Covid test.  She states she had a mild headache, but thinks it was due to her menses. She denies any other symptoms or complaints.   She did have Relationships      Social connections        Talks on phone: Not on file        Gets together: Not on file        Attends Evangelical service: Not on file        Active member of club or organization: Not on file        Attends meetings of clubs or organizat normal  NOSE: nasal turbinates: pink, normal mucosa  THROAT: clear, without exudates, uvula midline and airway patent  LUNGS: clear to auscultation bilaterally; no rales, rhonchi, or wheezes    MDM/Assessment/Plan:   Orders for this encounter:  Orders Plac

## 2020-11-24 NOTE — ED INITIAL ASSESSMENT (HPI)
Pt c/o headache for 3 days starting 11/21. States began her period 2 day ago. No headache now. States colleague awaiting covid results. No cold/cough. No fever. Awake/alert. Breathing easy and even without distress. Speech clear.  Skin warm, dry and pi

## 2020-12-16 ENCOUNTER — HOSPITAL ENCOUNTER (OUTPATIENT)
Age: 30
Discharge: HOME | End: 2020-12-16
Payer: COMMERCIAL

## 2020-12-16 VITALS
RESPIRATION RATE: 17 BRPM | DIASTOLIC BLOOD PRESSURE: 83 MMHG | BODY MASS INDEX: 26 KG/M2 | OXYGEN SATURATION: 100 % | HEART RATE: 94 BPM | WEIGHT: 160 LBS | TEMPERATURE: 98 F | SYSTOLIC BLOOD PRESSURE: 139 MMHG

## 2020-12-16 DIAGNOSIS — N89.8 VAGINAL ODOR: Primary | ICD-10-CM

## 2020-12-16 PROCEDURE — 87086 URINE CULTURE/COLONY COUNT: CPT | Performed by: NURSE PRACTITIONER

## 2020-12-16 PROCEDURE — 87106 FUNGI IDENTIFICATION YEAST: CPT | Performed by: NURSE PRACTITIONER

## 2020-12-16 PROCEDURE — 99213 OFFICE O/P EST LOW 20 MIN: CPT | Performed by: NURSE PRACTITIONER

## 2020-12-16 PROCEDURE — 87808 TRICHOMONAS ASSAY W/OPTIC: CPT | Performed by: NURSE PRACTITIONER

## 2020-12-16 PROCEDURE — 87205 SMEAR GRAM STAIN: CPT | Performed by: NURSE PRACTITIONER

## 2020-12-16 PROCEDURE — 81025 URINE PREGNANCY TEST: CPT | Performed by: NURSE PRACTITIONER

## 2020-12-16 PROCEDURE — 81002 URINALYSIS NONAUTO W/O SCOPE: CPT | Performed by: NURSE PRACTITIONER

## 2020-12-16 RX ORDER — METRONIDAZOLE 500 MG/1
500 TABLET ORAL 2 TIMES DAILY
Qty: 14 TABLET | Refills: 0 | Status: SHIPPED | OUTPATIENT
Start: 2020-12-16 | End: 2020-12-23

## 2020-12-17 NOTE — ED PROVIDER NOTES
Patient Seen in: [facility]    History   CC: vaginal odor  HPI: Rajwinder Reed 35year old female  who presents c/o some burning with urination however states her biggest complaint is a very strong \"fishy\" vaginal odor.  +urinary ffreq Oral Tab,  Take 1 tablet by mouth daily. Wheat Dextrin (BENEFIBER OR),  Take by mouth. Multiple Vitamin Oral Tab,  Take 1 tablet by mouth daily. Constitutional and vital signs reviewed.         Physical Exam     ED Triage Vitals [12/16/20 1906 denies concern for STI. Declines GC/chlamydia testing. Pt states her biggest concern is the vaginal odor. Will empirically start Flagyl for BV and advised close f/u with PCP. Return/ED precautions also reviewed. General BV instructions reviewed.   Ashia

## 2024-08-06 ENCOUNTER — APPOINTMENT (OUTPATIENT)
Dept: URBAN - METROPOLITAN AREA SURGERY 12 | Age: 34
Setting detail: DERMATOLOGY
End: 2024-08-07

## 2024-08-06 DIAGNOSIS — L70.0 ACNE VULGARIS: ICD-10-CM

## 2024-08-06 PROCEDURE — OTHER PRESCRIPTION: OTHER

## 2024-08-06 PROCEDURE — OTHER COUNSELING: OTHER

## 2024-08-06 PROCEDURE — 99204 OFFICE O/P NEW MOD 45 MIN: CPT

## 2024-08-06 PROCEDURE — OTHER ADDITIONAL NOTES: OTHER

## 2024-08-06 RX ORDER — CLASCOTERONE 1 G/100G
CREAM TOPICAL
Qty: 60 | Refills: 0 | Status: ERX | COMMUNITY
Start: 2024-08-06

## 2024-08-06 ASSESSMENT — LOCATION ZONE DERM
LOCATION ZONE: TRUNK
LOCATION ZONE: FACE

## 2024-08-06 ASSESSMENT — LOCATION SIMPLE DESCRIPTION DERM
LOCATION SIMPLE: CHEST
LOCATION SIMPLE: LEFT FOREHEAD
LOCATION SIMPLE: RIGHT UPPER BACK

## 2024-08-06 ASSESSMENT — LOCATION DETAILED DESCRIPTION DERM
LOCATION DETAILED: UPPER STERNUM
LOCATION DETAILED: RIGHT SUPERIOR MEDIAL UPPER BACK
LOCATION DETAILED: LEFT INFERIOR MEDIAL FOREHEAD

## 2024-08-06 NOTE — PROCEDURE: COUNSELING
Detail Level: Detailed
Topical Clindamycin Pregnancy And Lactation Text: This medication is Pregnancy Category B and is considered safe during pregnancy. It is unknown if it is excreted in breast milk.
Spironolactone Pregnancy And Lactation Text: This medication can cause feminization of the male fetus and should be avoided during pregnancy. The active metabolite is also found in breast milk.
Benzoyl Peroxide Counseling: Patient counseled that medicine may cause skin irritation and bleach clothing.  In the event of skin irritation, the patient was advised to reduce the amount of the drug applied or use it less frequently.   The patient verbalized understanding of the proper use and possible adverse effects of benzoyl peroxide.  All of the patient's questions and concerns were addressed.
Topical Sulfur Applications Pregnancy And Lactation Text: This medication is Pregnancy Category C and has an unknown safety profile during pregnancy. It is unknown if this topical medication is excreted in breast milk.
Topical Retinoid counseling:  Patient advised to apply a pea-sized amount only at bedtime and wait 30 minutes after washing their face before applying.  If too drying, patient may add a non-comedogenic moisturizer. The patient verbalized understanding of the proper use and possible adverse effects of retinoids.  All of the patient's questions and concerns were addressed.
Doxycycline Pregnancy And Lactation Text: This medication is Pregnancy Category D and not consider safe during pregnancy. It is also excreted in breast milk but is considered safe for shorter treatment courses.
Minocycline Counseling: Patient advised regarding possible photosensitivity and discoloration of the teeth, skin, lips, tongue and gums.  Patient instructed to avoid sunlight, if possible.  When exposed to sunlight, patients should wear protective clothing, sunglasses, and sunscreen.  The patient was instructed to call the office immediately if the following severe adverse effects occur:  hearing changes, easy bruising/bleeding, severe headache, or vision changes.  The patient verbalized understanding of the proper use and possible adverse effects of minocycline.  All of the patient's questions and concerns were addressed.
Tetracycline Pregnancy And Lactation Text: This medication is Pregnancy Category D and not consider safe during pregnancy. It is also excreted in breast milk.
Bactrim Counseling:  I discussed with the patient the risks of sulfa antibiotics including but not limited to GI upset, allergic reaction, drug rash, diarrhea, dizziness, photosensitivity, and yeast infections.  Rarely, more serious reactions can occur including but not limited to aplastic anemia, agranulocytosis, methemoglobinemia, blood dyscrasias, liver or kidney failure, lung infiltrates or desquamative/blistering drug rashes.
Topical Retinoid Pregnancy And Lactation Text: This medication is Pregnancy Category C. It is unknown if this medication is excreted in breast milk.
Winlevi Counseling:  I discussed with the patient the risks of topical clascoterone including but not limited to erythema, scaling, itching, and stinging. Patient voiced their understanding.
Birth Control Pills Pregnancy And Lactation Text: This medication should be avoided if pregnant and for the first 30 days post-partum.
Azithromycin Counseling:  I discussed with the patient the risks of azithromycin including but not limited to GI upset, allergic reaction, drug rash, diarrhea, and yeast infections.
Dapsone Pregnancy And Lactation Text: This medication is Pregnancy Category C and is not considered safe during pregnancy or breast feeding.
High Dose Vitamin A Counseling: Side effects reviewed, pt to contact office should one occur.
Tazorac Pregnancy And Lactation Text: This medication is not safe during pregnancy. It is unknown if this medication is excreted in breast milk.
Azelaic Acid Counseling: Patient counseled that medicine may cause skin irritation and to avoid applying near the eyes.  In the event of skin irritation, the patient was advised to reduce the amount of the drug applied or use it less frequently.   The patient verbalized understanding of the proper use and possible adverse effects of azelaic acid.  All of the patient's questions and concerns were addressed.
Isotretinoin Counseling: Patient should get monthly blood tests, not donate blood, not drive at night if vision affected, not share medication, and not undergo elective surgery for 6 months after tx completed. Side effects reviewed, pt to contact office should one occur.
High Dose Vitamin A Pregnancy And Lactation Text: High dose vitamin A therapy is contraindicated during pregnancy and breast feeding.
Include Pregnancy/Lactation Warning?: No
Azithromycin Pregnancy And Lactation Text: This medication is considered safe during pregnancy and is also secreted in breast milk.
Doxycycline Counseling:  Patient counseled regarding possible photosensitivity and increased risk for sunburn.  Patient instructed to avoid sunlight, if possible.  When exposed to sunlight, patients should wear protective clothing, sunglasses, and sunscreen.  The patient was instructed to call the office immediately if the following severe adverse effects occur:  hearing changes, easy bruising/bleeding, severe headache, or vision changes.  The patient verbalized understanding of the proper use and possible adverse effects of doxycycline.  All of the patient's questions and concerns were addressed.
Isotretinoin Pregnancy And Lactation Text: This medication is Pregnancy Category X and is considered extremely dangerous during pregnancy. It is unknown if it is excreted in breast milk.
Aklief counseling:  Patient advised to apply a pea-sized amount only at bedtime and wait 30 minutes after washing their face before applying.  If too drying, patient may add a non-comedogenic moisturizer.  The most commonly reported side effects including irritation, redness, scaling, dryness, stinging, burning, itching, and increased risk of sunburn.  The patient verbalized understanding of the proper use and possible adverse effects of retinoids.  All of the patient's questions and concerns were addressed.
Bactrim Pregnancy And Lactation Text: This medication is Pregnancy Category D and is known to cause fetal risk.  It is also excreted in breast milk.
Erythromycin Counseling:  I discussed with the patient the risks of erythromycin including but not limited to GI upset, allergic reaction, drug rash, diarrhea, increase in liver enzymes, and yeast infections.
Benzoyl Peroxide Pregnancy And Lactation Text: This medication is Pregnancy Category C. It is unknown if benzoyl peroxide is excreted in breast milk.
Tetracycline Counseling: Patient counseled regarding possible photosensitivity and increased risk for sunburn.  Patient instructed to avoid sunlight, if possible.  When exposed to sunlight, patients should wear protective clothing, sunglasses, and sunscreen.  The patient was instructed to call the office immediately if the following severe adverse effects occur:  hearing changes, easy bruising/bleeding, severe headache, or vision changes.  The patient verbalized understanding of the proper use and possible adverse effects of tetracycline.  All of the patient's questions and concerns were addressed. Patient understands to avoid pregnancy while on therapy due to potential birth defects.
Topical Sulfur Applications Counseling: Topical Sulfur Counseling: Patient counseled that this medication may cause skin irritation or allergic reactions.  In the event of skin irritation, the patient was advised to reduce the amount of the drug applied or use it less frequently.   The patient verbalized understanding of the proper use and possible adverse effects of topical sulfur application.  All of the patient's questions and concerns were addressed.
Erythromycin Pregnancy And Lactation Text: This medication is Pregnancy Category B and is considered safe during pregnancy. It is also excreted in breast milk.
Birth Control Pills Counseling: Birth Control Pill Counseling: I discussed with the patient the potential side effects of OCPs including but not limited to increased risk of stroke, heart attack, thrombophlebitis, deep venous thrombosis, hepatic adenomas, breast changes, GI upset, headaches, and depression.  The patient verbalized understanding of the proper use and possible adverse effects of OCPs. All of the patient's questions and concerns were addressed.
Sarecycline Counseling: Patient advised regarding possible photosensitivity and discoloration of the teeth, skin, lips, tongue and gums.  Patient instructed to avoid sunlight, if possible.  When exposed to sunlight, patients should wear protective clothing, sunglasses, and sunscreen.  The patient was instructed to call the office immediately if the following severe adverse effects occur:  hearing changes, easy bruising/bleeding, severe headache, or vision changes.  The patient verbalized understanding of the proper use and possible adverse effects of sarecycline.  All of the patient's questions and concerns were addressed.
Aklief Pregnancy And Lactation Text: It is unknown if this medication is safe to use during pregnancy.  It is unknown if this medication is excreted in breast milk.  Breastfeeding women should use the topical cream on the smallest area of the skin for the shortest time needed while breastfeeding.  Do not apply to nipple and areola.
Tazorac Counseling:  Patient advised that medication is irritating and drying.  Patient may need to apply sparingly and wash off after an hour before eventually leaving it on overnight.  The patient verbalized understanding of the proper use and possible adverse effects of tazorac.  All of the patient's questions and concerns were addressed.
Spironolactone Counseling: Patient advised regarding risks of diarrhea, abdominal pain, hyperkalemia, birth defects (for female patients), liver toxicity and renal toxicity. The patient may need blood work to monitor liver and kidney function and potassium levels while on therapy. The patient verbalized understanding of the proper use and possible adverse effects of spironolactone.  All of the patient's questions and concerns were addressed.
Azelaic Acid Pregnancy And Lactation Text: This medication is considered safe during pregnancy and breast feeding.
Topical Clindamycin Counseling: Patient counseled that this medication may cause skin irritation or allergic reactions.  In the event of skin irritation, the patient was advised to reduce the amount of the drug applied or use it less frequently.   The patient verbalized understanding of the proper use and possible adverse effects of clindamycin.  All of the patient's questions and concerns were addressed.
Winlevi Pregnancy And Lactation Text: This medication is considered safe during pregnancy and breastfeeding.
Dapsone Counseling: I discussed with the patient the risks of dapsone including but not limited to hemolytic anemia, agranulocytosis, rashes, methemoglobinemia, kidney failure, peripheral neuropathy, headaches, GI upset, and liver toxicity.  Patients who start dapsone require monitoring including baseline LFTs and weekly CBCs for the first month, then every month thereafter.  The patient verbalized understanding of the proper use and possible adverse effects of dapsone.  All of the patient's questions and concerns were addressed.

## 2024-08-06 NOTE — PROCEDURE: ADDITIONAL NOTES
Detail Level: Zone
Additional Notes: In office today, pt states coming off birth control approximately 2 months ago prior to flare ups beginning; states not currently trying to become pregnant or plan to become pregnant in the next couple months. Flares observed today consistent with hormonal acne. Discussed Spironolactone as treatment option today; however, pt elects for topicals only at this time. Rec otc SA wash daily, begin otc Adapalene aa every other night up to every night as tolerable and will route Winlevi ([facility]) aa bid. Plan to f/u in 4 weeks\\n\\nNote she is not interested in other oral options and hesitant to use any rx, but agreed to try winlevi. Also discussed cosmetic options of chemical peels and hydrafacial as well as skin care products within clinic
Render Risk Assessment In Note?: no

## 2024-11-19 ENCOUNTER — APPOINTMENT (OUTPATIENT)
Dept: URBAN - METROPOLITAN AREA SURGERY 12 | Age: 34
Setting detail: DERMATOLOGY
End: 2024-11-20

## 2024-11-19 DIAGNOSIS — L70.0 ACNE VULGARIS: ICD-10-CM

## 2024-11-19 PROCEDURE — OTHER COUNSELING: OTHER

## 2024-11-19 PROCEDURE — 99214 OFFICE O/P EST MOD 30 MIN: CPT

## 2024-11-19 PROCEDURE — OTHER PRESCRIPTION MEDICATION MANAGEMENT: OTHER

## 2024-11-19 PROCEDURE — OTHER ADDITIONAL NOTES: OTHER

## 2024-11-19 ASSESSMENT — LOCATION ZONE DERM
LOCATION ZONE: FACE
LOCATION ZONE: TRUNK

## 2024-11-19 ASSESSMENT — LOCATION SIMPLE DESCRIPTION DERM
LOCATION SIMPLE: RIGHT UPPER BACK
LOCATION SIMPLE: LEFT FOREHEAD
LOCATION SIMPLE: CHEST

## 2024-11-19 NOTE — PROCEDURE: PRESCRIPTION MEDICATION MANAGEMENT
Detail Level: Zone
Render In Strict Bullet Format?: No
Initiate Treatment: Winlevi bid
Continue Regimen: Adapalene nightly \\nOTC Sal acid cleanser

## 2024-11-19 NOTE — PROCEDURE: ADDITIONAL NOTES
Detail Level: Zone
Additional Notes: Pt states acne clear to trunk but persistent to jawline and chin, with hormonal flares around menses.\\n\\nPt never started winlevi d/t concerns of affecting systemic hormones but after discussion in clinic today she feels more comfortable with it and plans to start it. Discussed mechanism of action and provided more info on winlevi website , provided reassurance.\\n\\nPt feels more comfortable after discussing and will start winlevi and continue adapalene. She knows to stop these meds in future if she becomes pregnant, which she says she is not currently planning  for.
Render Risk Assessment In Note?: no

## 2025-01-24 ENCOUNTER — RX ONLY (RX ONLY)
Age: 35
End: 2025-01-24

## 2025-01-24 RX ORDER — CLASCOTERONE 1 G/100G
CREAM TOPICAL
Qty: 60 | Refills: 2 | Status: ERX

## 2025-02-25 ENCOUNTER — APPOINTMENT (OUTPATIENT)
Dept: URBAN - METROPOLITAN AREA SURGERY 12 | Age: 35
Setting detail: DERMATOLOGY
End: 2025-02-26

## 2025-02-25 DIAGNOSIS — L70.0 ACNE VULGARIS: ICD-10-CM

## 2025-02-25 PROCEDURE — OTHER ADDITIONAL NOTES: OTHER

## 2025-02-25 PROCEDURE — OTHER PRESCRIPTION: OTHER

## 2025-02-25 PROCEDURE — 99214 OFFICE O/P EST MOD 30 MIN: CPT

## 2025-02-25 PROCEDURE — OTHER PRESCRIPTION MEDICATION MANAGEMENT: OTHER

## 2025-02-25 PROCEDURE — OTHER COUNSELING: OTHER

## 2025-02-25 RX ORDER — TRIFAROTENE 50 UG/G
CREAM TOPICAL
Qty: 45 | Refills: 2 | Status: ERX | COMMUNITY
Start: 2025-02-25

## 2025-02-25 ASSESSMENT — LOCATION ZONE DERM: LOCATION ZONE: FACE

## 2025-02-25 ASSESSMENT — LOCATION SIMPLE DESCRIPTION DERM
LOCATION SIMPLE: LEFT CHEEK
LOCATION SIMPLE: RIGHT CHEEK

## 2025-02-25 ASSESSMENT — LOCATION DETAILED DESCRIPTION DERM
LOCATION DETAILED: RIGHT LATERAL BUCCAL CHEEK
LOCATION DETAILED: LEFT LATERAL BUCCAL CHEEK

## 2025-02-25 NOTE — PROCEDURE: ADDITIONAL NOTES
Detail Level: Zone
Additional Notes: Pt saw gyn rec and was told she doesn’t have pcos.\\nPer pt Labs showed testosterone slightly elevated but still in normal range. \\nAcne to trunk still well, but still persistent jawline hormonal flares of acne.\\n\\nPt hesitant to consider oral therapy. Recommended adding spironlactone or replacing winlevi with spironlactone but pt declines oral options in office today but would be open to trying a different retnoid topical. Discussed MOA.\\nRediscussed chemical peels, hydra facial, and also offered vbeam laser for acne txt. Discussed could discount full face typical cost if only treating jawline.\\n\\nAlso note she is not interested in ocp. Took for 7-8 years and had terrible onset of acne per pt once stopping ocp with no prior acne hx.\\nConsider having vit d checked at next pcp appt and supplement if low.
Render Risk Assessment In Note?: no

## 2025-05-27 ENCOUNTER — APPOINTMENT (OUTPATIENT)
Dept: URBAN - METROPOLITAN AREA SURGERY 12 | Age: 35
Setting detail: DERMATOLOGY
End: 2025-05-27

## 2025-05-27 DIAGNOSIS — L70.0 ACNE VULGARIS: ICD-10-CM

## 2025-05-27 PROCEDURE — 99214 OFFICE O/P EST MOD 30 MIN: CPT

## 2025-05-27 PROCEDURE — OTHER PRESCRIPTION MEDICATION MANAGEMENT: OTHER

## 2025-05-27 PROCEDURE — OTHER ADDITIONAL NOTES: OTHER

## 2025-05-27 PROCEDURE — OTHER COUNSELING: OTHER

## (undated) NOTE — ED AVS SNAPSHOT
Russel Lemus   MRN: [id number]    Department:  [facility]   Date of Visit:  7/19/2019           Disclosure     Insurance plans vary and the physician(s) referred by the ER may not be covered by your plan.  Please co CARE PHYSICIAN AT ONCE OR RETURN IMMEDIATELY TO THE EMERGENCY DEPARTMENT. If you have been prescribed any medication(s), please fill your prescription right away and begin taking the medication(s) as directed.   If you believe that any of the medications

## (undated) NOTE — LETTER
Date & Time: 8/23/2020, 12:33 PM  Patient: India Llamas  Encounter Provider(s):    VANESSA Villagomez       To Whom It May Concern:    India Llamas was seen and treated in our department on 8/23/2020.  She should not return to work until

## (undated) NOTE — LETTER
Date & Time: 1/14/2019, 9:03 PM  Patient: Misael Oconnor  Encounter Provider(s):    Justyna Campbell MD       To Whom It May Concern:    Misael Oconnor was seen and treated in our department on 1/14/2019, please excuse her from activities today

## (undated) NOTE — MR AVS SNAPSHOT
After Visit Summary   11/5/2020    Nicole Ceballos    MRN: [id number]           Visit Information     Date & Time  11/5/2020  3:20 PM Provider  Matti Valenzuela MD [address] [place] [address], [address] [place] [address],[address], [facility].  Ph [facility] now offers Video Visits through [address] for adult and pediatric patients. Video Visits are available Monday - Friday for many common conditions such as allergies, colds, cough, fever, rash, sore throat, headache and pink eye.   The cost for a Video Vi [address]   Monday – Friday  4:00 pm – 10:00 pm   Saturday – Sunday  10:00 am – 4:00 pm  WALK-IN CARE  Emergency Medicine Providers  Conditions needing urgent attention, but are   non-life-threatening.     Also available by appointment Average cost  $120*

## (undated) NOTE — ED AVS SNAPSHOT
[facility] GABE Javier    [address] [place] [address]    Phone:  [phone]    Fax:  [phone]           Tyrone Palacios   MRN: [id number]    Department:  [address] [facility]   Date of Vi [facility] [place], [address]  [phone] [address]  [phone] [address] [place] [address] Leo Estrada 1   [phone]       To Check ER Wait Times:  TEXT 'ERwait' to 26 reading, you will be contacted. Please make sure we have your correct phone number before you leave. After you leave, you should follow the attached instructions. I have read and understand the instructions given to me by my caregivers.         24-Hour Sign up for [employer], your secure online medical record. [employer] will allow you to access patient instructions from your recent visit,  view other health information, and more. To sign up or find more information, go to [email or web address]

## (undated) NOTE — ED AVS SNAPSHOT
Russel Lemus   MRN: [id number]    Department:  [facility] Emergency Department   Date of Visit:  9/5/2017           Disclosure     Insurance plans vary and the physician(s) referred by the ER may not be covered by your plan.  Please con CARE PHYSICIAN AT ONCE OR RETURN IMMEDIATELY TO THE EMERGENCY DEPARTMENT. If you have been prescribed any medication(s), please fill your prescription right away and begin taking the medication(s) as directed.   If you believe that any of the medications

## (undated) NOTE — LETTER
March 27, 2017    Patient: Bolivar Turner   Date of Visit: 3/27/2017       To Whom It May Concern:    Bolivar Turner was seen and treated in our emergency department on 3/27/2017. She should not return to work until 3/28/2017.     If you hav

## (undated) NOTE — ED AVS SNAPSHOT
[facility] AND [facility] in [address] [address]    Phone:  [phone]    Fax:  [phone]           Michael Conradtessy   MRN: [id number]    Department:  [facility] Care in [address] [facility]  under your health insurance plan. Please contact your insurance company and physician's office to determine coverage and benefits available for follow-up care and referrals.      It is our goal to assure that you are completely satisfied with every aspect doctor until you can check with your doctor. Please bring the medication list to your next doctor's appointment. Any imaging studies and labs completed today can be reviewed in your i[employer]ulfillment[employer] account.   You may have had testing done that requires us to co Medicaid plans. To get signed up and covered, please call [phone] and ask to get set up for an insurance coverage that is in-network with Doris Plata.         [employer]

## (undated) NOTE — LETTER
Date & Time: 6/1/2018, 9:11 PM  Patient: Michael Kim  Encounter Provider(s):    ANGELO Huerta       To Whom It May Concern:    Michael Kim was seen and treated in our department on 6/1/2018.  She may return to work after clearance

## (undated) NOTE — MR AVS SNAPSHOT
[facility] SPECIALTY [facility] [place] [address] [address] [address]  [phone]               Thank you for choosing us for your health care visit with Sydney Barros MD.  We are glad to serve you and happy to provide you with this summary o

## (undated) NOTE — Clinical Note
3/30/2017              [address] [place] [address]         Dear Violeta Vance,      It was a pleasure to see you at our [facility] office.  Your most recent pap and STD test

## (undated) NOTE — ED AVS SNAPSHOT
Kelton Mac   MRN: [id number]    Department:  [facility] Emergency Department   Date of Visit:  6/1/2018           Disclosure     Insurance plans vary and the physician(s) referred by the ER may not be covered by your plan.  Please con CARE PHYSICIAN AT ONCE OR RETURN IMMEDIATELY TO THE EMERGENCY DEPARTMENT. If you have been prescribed any medication(s), please fill your prescription right away and begin taking the medication(s) as directed.   If you believe that any of the medications

## (undated) NOTE — ED AVS SNAPSHOT
[facility] Emergency Department    [place]  [address] [address]    Phone:  [phone]    Fax:  [address]   MRN: [id number]    Department:  [facility] Emergency Department   Date of Visit: IF THERE IS ANY CHANGE OR WORSENING OF YOUR CONDITION, CALL YOUR PRIMARY CARE PHYSICIAN AT ONCE OR RETURN IMMEDIATELY TO THE EMERGENCY DEPARTMENT.     If you have been prescribed any medication(s), please fill your prescription right away and begin taking t

## (undated) NOTE — ED AVS SNAPSHOT
[facility] AND [facility] Immediate Care in [address] [address]    Phone:  [phone]    Fax:  [phone]           Mindi Dakins   MRN: [id number]    Department:  [facility] AND [facility] Immediate Care in [address] [facility]  benefit level being available to you or other limited reimbursement. The physician may seek payment directly from you for amounts other than your deductible, co-payment, or co-insurance and for other services not covered under your health insurance plan. If you believe that any of the medications or instructions on this list is different from what your Primary Care doctor has instructed you - please continue to take your medications as instructed by your Primary Care doctor until you can check with your do Patient [address] to help you get signed up for insurance coverage. Patient [address] is a Federal Navigator program that can help with your Affordable Care Act coverage, as well as all types of Medicaid plans.   To get signed up and covere

## (undated) NOTE — MR AVS SNAPSHOT
[facility]  [address] [place] [address]  [phone]               Thank you for choosing us [facility] We are glad to serve you and happy to provide you with this summary of your visit.   Tiesha acquired. Please contact the Patient Business Office at [phone] if you have any questions related to insurance coverage. Thank you.          Reason for [facility] F/U           Medical Issues Discussed Today     RUQ pain    -  Primary

## (undated) NOTE — ED AVS SNAPSHOT
[facility] Emergency Department    [place]  [address] [place] [address]    Phone:  [phone]    Fax:  [address]   MRN: [id number]    Department:  [facility] Emergency Department   Date of Visit: You have not been prescribed any medications.             Discharge References/Attachments     BILIARY COLIC WITH GALLSTONE (PRESUMED) (ENGLISH)    ABDOMINAL PAIN, UNKNOWN CAUSE, (FEMALE) (ENGLISH)      Disclosure     Insurance plans vary and the physician CARE PHYSICIAN AT ONCE OR RETURN IMMEDIATELY TO THE EMERGENCY DEPARTMENT.     If you have been prescribed any medication(s), please fill your prescription right away and begin taking the medication(s) as directed    If the emergency physician has read X-ray coverage. Patient [address] is a Federal Navigator program that can help with your Affordable Care Act coverage, as well as all types of Medicaid plans.   To get signed up and covered, please call [phone] and ask to get set up for an insuran PATIENT STATED HISTORY:(As transcribed by Technologist)  Patient complains of pain on the right upper, anterior side of abdomen that radiates to her back. She says the pain becomes more severe after eating.       CONTRAST USED:  100 cc of Omnipaque 350 TECHNIQUE:  Real time gray-scale ultrasound was used to evaluate the abdomen. The exam includes images of the liver, gallbladder, common bile duct, pancreas, spleen, kidneys, IVC, and aorta. FINDINGS:    LIVER:  Normal size and echogenicity.  No signif